# Patient Record
Sex: FEMALE | Race: WHITE | Employment: OTHER | URBAN - METROPOLITAN AREA
[De-identification: names, ages, dates, MRNs, and addresses within clinical notes are randomized per-mention and may not be internally consistent; named-entity substitution may affect disease eponyms.]

---

## 2018-08-24 ENCOUNTER — HOSPITAL ENCOUNTER (INPATIENT)
Age: 83
LOS: 4 days | Discharge: HOME HEALTH | DRG: 193 | End: 2018-08-28
Attending: EMERGENCY MEDICINE | Admitting: INTERNAL MEDICINE
Payer: MEDICARE

## 2018-08-24 ENCOUNTER — APPOINTMENT (OUTPATIENT)
Dept: GENERAL RADIOLOGY | Age: 83
DRG: 193 | End: 2018-08-24
Payer: MEDICARE

## 2018-08-24 DIAGNOSIS — D72.825 BANDEMIA: ICD-10-CM

## 2018-08-24 DIAGNOSIS — J96.11 CHRONIC RESPIRATORY FAILURE WITH HYPOXIA (HCC): ICD-10-CM

## 2018-08-24 DIAGNOSIS — R65.10 SIRS (SYSTEMIC INFLAMMATORY RESPONSE SYNDROME) (HCC): ICD-10-CM

## 2018-08-24 DIAGNOSIS — J96.20 ACUTE ON CHRONIC RESPIRATORY FAILURE, UNSPECIFIED WHETHER WITH HYPOXIA OR HYPERCAPNIA (HCC): Primary | ICD-10-CM

## 2018-08-24 DIAGNOSIS — J18.9 PNEUMONIA DUE TO ORGANISM: ICD-10-CM

## 2018-08-24 PROBLEM — J96.21 ACUTE ON CHRONIC RESPIRATORY FAILURE WITH HYPOXIA (HCC): Status: ACTIVE | Noted: 2018-08-24

## 2018-08-24 LAB
A/G RATIO: 0.8 (ref 1.1–2.2)
ALBUMIN SERPL-MCNC: 3.5 G/DL (ref 3.4–5)
ALP BLD-CCNC: 83 U/L (ref 40–129)
ALT SERPL-CCNC: 14 U/L (ref 10–40)
ANION GAP SERPL CALCULATED.3IONS-SCNC: 15 MMOL/L (ref 3–16)
AST SERPL-CCNC: 25 U/L (ref 15–37)
BANDED NEUTROPHILS RELATIVE PERCENT: 29 % (ref 0–7)
BASE EXCESS ARTERIAL: 0.5 MMOL/L (ref -3–3)
BASOPHILS ABSOLUTE: 0 K/UL (ref 0–0.2)
BASOPHILS RELATIVE PERCENT: 0 %
BILIRUB SERPL-MCNC: 0.8 MG/DL (ref 0–1)
BILIRUBIN URINE: NEGATIVE
BLOOD, URINE: ABNORMAL
BUN BLDV-MCNC: 18 MG/DL (ref 7–20)
CALCIUM SERPL-MCNC: 9.3 MG/DL (ref 8.3–10.6)
CARBOXYHEMOGLOBIN ARTERIAL: 1.3 % (ref 0–1.5)
CHLORIDE BLD-SCNC: 91 MMOL/L (ref 99–110)
CLARITY: CLEAR
CO2: 25 MMOL/L (ref 21–32)
COLOR: YELLOW
CREAT SERPL-MCNC: 0.7 MG/DL (ref 0.6–1.2)
EOSINOPHILS ABSOLUTE: 0 K/UL (ref 0–0.6)
EOSINOPHILS RELATIVE PERCENT: 0 %
EPITHELIAL CELLS, UA: ABNORMAL /HPF
GFR AFRICAN AMERICAN: >60
GFR NON-AFRICAN AMERICAN: >60
GLOBULIN: 4.4 G/DL
GLUCOSE BLD-MCNC: 155 MG/DL (ref 70–99)
GLUCOSE URINE: 100 MG/DL
HCO3 ARTERIAL: 23.7 MMOL/L (ref 21–29)
HCT VFR BLD CALC: 44.1 % (ref 36–48)
HEMOGLOBIN, ART, EXTENDED: 14.2 G/DL (ref 12–16)
HEMOGLOBIN: 15.1 G/DL (ref 12–16)
KETONES, URINE: 15 MG/DL
LACTIC ACID: 1.8 MMOL/L (ref 0.4–2)
LEUKOCYTE ESTERASE, URINE: NEGATIVE
LYMPHOCYTES ABSOLUTE: 0.9 K/UL (ref 1–5.1)
LYMPHOCYTES RELATIVE PERCENT: 12 %
MCH RBC QN AUTO: 30.3 PG (ref 26–34)
MCHC RBC AUTO-ENTMCNC: 34.2 G/DL (ref 31–36)
MCV RBC AUTO: 88.6 FL (ref 80–100)
METHEMOGLOBIN ARTERIAL: 0.2 %
MICROSCOPIC EXAMINATION: YES
MONOCYTES ABSOLUTE: 1.4 K/UL (ref 0–1.3)
MONOCYTES RELATIVE PERCENT: 19 %
MYELOCYTE PERCENT: 1 %
NEUTROPHILS ABSOLUTE: 5.2 K/UL (ref 1.7–7.7)
NEUTROPHILS RELATIVE PERCENT: 39 %
NITRITE, URINE: NEGATIVE
O2 CONTENT ARTERIAL: 19 ML/DL
O2 SAT, ARTERIAL: 93.1 %
O2 THERAPY: ABNORMAL
PCO2 ARTERIAL: 34 MMHG (ref 35–45)
PDW BLD-RTO: 13.1 % (ref 12.4–15.4)
PH ARTERIAL: 7.46 (ref 7.35–7.45)
PH UA: 6
PLATELET # BLD: 300 K/UL (ref 135–450)
PLATELET SLIDE REVIEW: ADEQUATE
PMV BLD AUTO: 7.9 FL (ref 5–10.5)
PO2 ARTERIAL: 61.7 MMHG (ref 75–108)
POTASSIUM SERPL-SCNC: 4.6 MMOL/L (ref 3.5–5.1)
PRO-BNP: 308 PG/ML (ref 0–449)
PROTEIN UA: 30 MG/DL
RBC # BLD: 4.97 M/UL (ref 4–5.2)
RBC UA: ABNORMAL /HPF (ref 0–2)
SLIDE REVIEW: ABNORMAL
SODIUM BLD-SCNC: 131 MMOL/L (ref 136–145)
SPECIFIC GRAVITY UA: 1.01
TCO2 ARTERIAL: 24.7 MMOL/L
TOTAL PROTEIN: 7.9 G/DL (ref 6.4–8.2)
TROPONIN: <0.01 NG/ML
URINE REFLEX TO CULTURE: ABNORMAL
URINE TYPE: ABNORMAL
UROBILINOGEN, URINE: 4 E.U./DL
WBC # BLD: 7.5 K/UL (ref 4–11)
WBC UA: ABNORMAL /HPF (ref 0–5)

## 2018-08-24 PROCEDURE — 94640 AIRWAY INHALATION TREATMENT: CPT

## 2018-08-24 PROCEDURE — 6370000000 HC RX 637 (ALT 250 FOR IP): Performed by: PHYSICIAN ASSISTANT

## 2018-08-24 PROCEDURE — 93005 ELECTROCARDIOGRAM TRACING: CPT | Performed by: PHYSICIAN ASSISTANT

## 2018-08-24 PROCEDURE — 2700000000 HC OXYGEN THERAPY PER DAY

## 2018-08-24 PROCEDURE — 2060000000 HC ICU INTERMEDIATE R&B

## 2018-08-24 PROCEDURE — 2500000003 HC RX 250 WO HCPCS: Performed by: PHYSICIAN ASSISTANT

## 2018-08-24 PROCEDURE — 83880 ASSAY OF NATRIURETIC PEPTIDE: CPT

## 2018-08-24 PROCEDURE — 80053 COMPREHEN METABOLIC PANEL: CPT

## 2018-08-24 PROCEDURE — 96366 THER/PROPH/DIAG IV INF ADDON: CPT

## 2018-08-24 PROCEDURE — 6360000002 HC RX W HCPCS: Performed by: PHYSICIAN ASSISTANT

## 2018-08-24 PROCEDURE — 96375 TX/PRO/DX INJ NEW DRUG ADDON: CPT

## 2018-08-24 PROCEDURE — 82803 BLOOD GASES ANY COMBINATION: CPT

## 2018-08-24 PROCEDURE — 81001 URINALYSIS AUTO W/SCOPE: CPT

## 2018-08-24 PROCEDURE — 6360000002 HC RX W HCPCS: Performed by: EMERGENCY MEDICINE

## 2018-08-24 PROCEDURE — 87040 BLOOD CULTURE FOR BACTERIA: CPT

## 2018-08-24 PROCEDURE — 83605 ASSAY OF LACTIC ACID: CPT

## 2018-08-24 PROCEDURE — 2580000003 HC RX 258: Performed by: PHYSICIAN ASSISTANT

## 2018-08-24 PROCEDURE — 96365 THER/PROPH/DIAG IV INF INIT: CPT

## 2018-08-24 PROCEDURE — 85025 COMPLETE CBC W/AUTO DIFF WBC: CPT

## 2018-08-24 PROCEDURE — 99285 EMERGENCY DEPT VISIT HI MDM: CPT

## 2018-08-24 PROCEDURE — 84484 ASSAY OF TROPONIN QUANT: CPT

## 2018-08-24 PROCEDURE — S0028 INJECTION, FAMOTIDINE, 20 MG: HCPCS | Performed by: PHYSICIAN ASSISTANT

## 2018-08-24 PROCEDURE — 94664 DEMO&/EVAL PT USE INHALER: CPT

## 2018-08-24 RX ORDER — SODIUM CHLORIDE 0.9 % (FLUSH) 0.9 %
10 SYRINGE (ML) INJECTION PRN
Status: DISCONTINUED | OUTPATIENT
Start: 2018-08-24 | End: 2018-08-28 | Stop reason: HOSPADM

## 2018-08-24 RX ORDER — 0.9 % SODIUM CHLORIDE 0.9 %
30 INTRAVENOUS SOLUTION INTRAVENOUS ONCE
Status: COMPLETED | OUTPATIENT
Start: 2018-08-24 | End: 2018-08-24

## 2018-08-24 RX ORDER — IPRATROPIUM BROMIDE AND ALBUTEROL SULFATE 2.5; .5 MG/3ML; MG/3ML
1 SOLUTION RESPIRATORY (INHALATION) ONCE
Status: COMPLETED | OUTPATIENT
Start: 2018-08-24 | End: 2018-08-24

## 2018-08-24 RX ORDER — LEVOFLOXACIN 5 MG/ML
500 INJECTION, SOLUTION INTRAVENOUS ONCE
Status: COMPLETED | OUTPATIENT
Start: 2018-08-25 | End: 2018-08-25

## 2018-08-24 RX ORDER — SODIUM CHLORIDE 0.9 % (FLUSH) 0.9 %
10 SYRINGE (ML) INJECTION EVERY 12 HOURS SCHEDULED
Status: DISCONTINUED | OUTPATIENT
Start: 2018-08-24 | End: 2018-08-28 | Stop reason: HOSPADM

## 2018-08-24 RX ORDER — ONDANSETRON 2 MG/ML
4 INJECTION INTRAMUSCULAR; INTRAVENOUS EVERY 6 HOURS PRN
Status: DISCONTINUED | OUTPATIENT
Start: 2018-08-24 | End: 2018-08-28 | Stop reason: HOSPADM

## 2018-08-24 RX ORDER — ACETAMINOPHEN 500 MG
500 TABLET ORAL EVERY 6 HOURS PRN
COMMUNITY

## 2018-08-24 RX ORDER — LEVOFLOXACIN 5 MG/ML
250 INJECTION, SOLUTION INTRAVENOUS EVERY 24 HOURS
Status: DISCONTINUED | OUTPATIENT
Start: 2018-08-26 | End: 2018-08-28

## 2018-08-24 RX ORDER — ONDANSETRON 2 MG/ML
4 INJECTION INTRAMUSCULAR; INTRAVENOUS EVERY 30 MIN PRN
Status: DISCONTINUED | OUTPATIENT
Start: 2018-08-24 | End: 2018-08-24

## 2018-08-24 RX ORDER — ALBUTEROL SULFATE 2.5 MG/3ML
2.5 SOLUTION RESPIRATORY (INHALATION)
Status: DISCONTINUED | OUTPATIENT
Start: 2018-08-24 | End: 2018-08-24

## 2018-08-24 RX ORDER — ACETAMINOPHEN 160 MG
TABLET,DISINTEGRATING ORAL
COMMUNITY

## 2018-08-24 RX ORDER — ALBUTEROL SULFATE 2.5 MG/3ML
2.5 SOLUTION RESPIRATORY (INHALATION)
Status: DISCONTINUED | OUTPATIENT
Start: 2018-08-25 | End: 2018-08-26

## 2018-08-24 RX ORDER — METHYLPREDNISOLONE SODIUM SUCCINATE 125 MG/2ML
125 INJECTION, POWDER, LYOPHILIZED, FOR SOLUTION INTRAMUSCULAR; INTRAVENOUS ONCE
Status: COMPLETED | OUTPATIENT
Start: 2018-08-24 | End: 2018-08-24

## 2018-08-24 RX ORDER — LEVOFLOXACIN 5 MG/ML
500 INJECTION, SOLUTION INTRAVENOUS EVERY 24 HOURS
Status: DISCONTINUED | OUTPATIENT
Start: 2018-08-25 | End: 2018-08-24 | Stop reason: DRUGHIGH

## 2018-08-24 RX ADMIN — IPRATROPIUM BROMIDE AND ALBUTEROL SULFATE 1 AMPULE: .5; 3 SOLUTION RESPIRATORY (INHALATION) at 18:33

## 2018-08-24 RX ADMIN — SODIUM CHLORIDE 1000 ML: 9 INJECTION, SOLUTION INTRAVENOUS at 18:01

## 2018-08-24 RX ADMIN — METHYLPREDNISOLONE SODIUM SUCCINATE 125 MG: 125 INJECTION, POWDER, FOR SOLUTION INTRAMUSCULAR; INTRAVENOUS at 18:00

## 2018-08-24 RX ADMIN — IPRATROPIUM BROMIDE AND ALBUTEROL SULFATE 1 AMPULE: .5; 3 SOLUTION RESPIRATORY (INHALATION) at 18:34

## 2018-08-24 RX ADMIN — CEFTRIAXONE SODIUM 1 G: 1 INJECTION, POWDER, FOR SOLUTION INTRAMUSCULAR; INTRAVENOUS at 18:01

## 2018-08-24 RX ADMIN — ONDANSETRON HYDROCHLORIDE 4 MG: 2 INJECTION, SOLUTION INTRAMUSCULAR; INTRAVENOUS at 18:01

## 2018-08-24 RX ADMIN — FAMOTIDINE 20 MG: 10 INJECTION, SOLUTION INTRAVENOUS at 18:00

## 2018-08-24 RX ADMIN — AZITHROMYCIN MONOHYDRATE 500 MG: 500 INJECTION, POWDER, LYOPHILIZED, FOR SOLUTION INTRAVENOUS at 19:36

## 2018-08-24 NOTE — ED PROVIDER NOTES
CHIEF COMPLAINT   Shortness of Breath (increased shortness of breath for 2 weeks Patient sent from PCP for pneumonia)      PATIENT INFORMATION  Nancy Grady is a 80 y.o. female who presents to the ED for evaluation of 2 weeks of increasing SOB, fevers and chills, Gradual onset, gradually worsening. +productive cough, symptoms worse with exertion. Pt has home NCO2, 2L for home PRN use, she states she just uses at night time, she has been needing it all the time, she has been vomiting. Unable to tolerate PO intake over the past 2 days. Saw PCP earlier today who did xray, advised double pneumonia, she needed to come to the ED for admission. I have reviewed the following from the nursing documentation, and I have confirmed the past medical history, medications, allergies, social history and family history with the patient. Past Medical History:   Diagnosis Date    Cancer [facility])     skin cancer     Chronic respiratory failure (HCC)     Emphysema     Left THR 2/21/2012    Osteoporosis     Pneumonia      Past Surgical History:   Procedure Laterality Date    ECTOPIC PREGNANCY SURGERY      EYE SURGERY Left 6/26/14    Left Eye Catararact    JOINT REPLACEMENT  2/20/12    BILATERAL HIP REPLACEMENT    JOINT REPLACEMENT      bilaterally    OTHER SURGICAL HISTORY  5-    right total hip replacement    TUBAL LIGATION       Family History   Problem Relation Age of Onset    Heart Disease Mother     Heart Disease Father      Social History     Social History    Marital status: Single     Spouse name: N/A    Number of children: N/A    Years of education: N/A     Occupational History    Not on file.      Social History Main Topics    Smoking status: Former Smoker     Years: 30.00     Quit date: 1/1/2012    Smokeless tobacco: Never Used    Alcohol use No    Drug use: No    Sexual activity: Not on file     Other Topics Concern    Not on file     Social History Narrative    No narrative on file     Current Facility-Administered Medications   Medication Dose Route Frequency Provider Last Rate Last Dose    sodium chloride flush 0.9 % injection 10 mL  10 mL Intravenous 2 times per day Kaitlynn Gongora MD        sodium chloride flush 0.9 % injection 10 mL  10 mL Intravenous PRN Kaitlynn Gongora MD        magnesium hydroxide (MILK OF MAGNESIA) 400 MG/5ML suspension 30 mL  30 mL Oral Daily PRN Kaitlynn Gongora MD        ondansetron [facility]) injection 4 mg  4 mg Intravenous Q6H PRN Kaitlynn Gongora MD        [START ON 8/25/2018] enoxaparin (LOVENOX) injection 40 mg  40 mg Subcutaneous Daily Kaitlynn Gongora MD        albuterol (PROVENTIL) nebulizer solution 2.5 mg  2.5 mg Nebulization Q2H PRN MD Jessica Conleypanfilo Topete Billey Blind ON 8/25/2018] levofloxacin (LEVAQUIN) 500 MG/100ML infusion 500 mg  500 mg Intravenous Once Kaitlynn Gongora MD        Followed by   Bird Aguilar ON 8/26/2018] levofloxacin (LEVAQUIN) 250 MG/50ML infusion 250 mg  250 mg Intravenous Q24H Kaitlynn Gongora MD         No Known Allergies    REVIEW OF SYSTEMS  10 systems reviewed, pertinent positives per HPI otherwise noted to be negative. PHYSICAL EXAM  BP (!) 141/74   Pulse 105   Temp 99.4 °F (37.4 °C) (Oral)   Resp 24   Ht 5' 2\" (1.575 m)   Wt 130 lb (59 kg)   SpO2 93%   Breastfeeding? No   BMI 23.78 kg/m²  Abnormal, 90% on 6LNC    GENERAL APPEARANCE: Awake and alert. Cooperative. Mild distress. Diaphoresis  HEAD: Normocephalic. Atraumatic. EYES: PERRL. EOM's grossly intact. No scleral injection or icterus. ENT: Mucous membranes are moist.   NECK: Supple. No tracheal deviation. HEART: Tachycardia  LUNGS: Respirations labored. +increased work of breathing, accessory muscle use.  +crackles to bases, worse on right. ABDOMEN: Soft. Non-distended. Non-tender. No guarding or rebound. Normal bowel sounds. EXTREMITIES: No peripheral edema. Moves all extremities equally. All extremities neurovascularly intact. SKIN: Warm and dry. pO2, Arterial 61.7 (L) 75.0 - 108.0 mmHg    HCO3, Arterial 23.7 21.0 - 29.0 mmol/L    Base Excess, Arterial 0.5 -3.0 - 3.0 mmol/L    Hemoglobin, Art, Extended 14.2 12.0 - 16.0 g/dL    O2 Sat, Arterial 93.1 >92 %    Carboxyhgb, Arterial 1.3 0.0 - 1.5 %    Methemoglobin, Arterial 0.2 <1.5 %    TCO2, Arterial 24.7 Not Established mmol/L    O2 Content, Arterial 19 Not Established mL/dL    O2 Therapy Unknown    Urinalysis Reflex to Culture   Result Value Ref Range    Color, UA Yellow Straw/Yellow    Clarity, UA Clear Clear    Glucose, Ur 100 (A) Negative mg/dL    Bilirubin Urine Negative Negative    Ketones, Urine 15 (A) Negative mg/dL    Specific Gravity, UA 1.010 1.005 - 1.030    Blood, Urine TRACE-INTACT (A) Negative    pH, UA 6.0 5.0 - 8.0    Protein, UA 30 (A) Negative mg/dL    Urobilinogen, Urine 4.0 (A) <2.0 E.U./dL    Nitrite, Urine Negative Negative    Leukocyte Esterase, Urine Negative Negative    Microscopic Examination YES     Urine Reflex to Culture Not Indicated     Urine Type Not Specified    Brain Natriuretic Peptide   Result Value Ref Range    Pro- 0 - 449 pg/mL   Troponin   Result Value Ref Range    Troponin <0.01 <0.01 ng/mL   Microscopic Urinalysis   Result Value Ref Range    WBC, UA 0-2 0 - 5 /HPF    RBC, UA 3-5 (A) 0 - 2 /HPF    Epi Cells 0-2 /HPF   EKG 12 lead   Result Value Ref Range    Ventricular Rate 97 BPM    Atrial Rate 97 BPM    P-R Interval 166 ms    QRS Duration 90 ms    Q-T Interval 342 ms    QTc Calculation (Bazett) 434 ms    P Axis 65 degrees    R Axis 70 degrees    T Axis 74 degrees    Diagnosis       Normal sinus rhythmPossible Left atrial enlargementRSR' or QR pattern in V1 suggests right ventricular conduction delayBorderline ECGWhen compared with ECG of 29-MAY-2014 09:37,Vent. rate has increased BY  37 BPM           RADIOLOGY  (From paper work brought in with patient.)     Ave At [address] [place] [address]  08/24/2018:    IMPRESSION: Development of patchy bibasilar infiltrates suggesting pneumonia      CONSULTATIONS: Hospitalist, Padmaja Hood, agrees to admit in stable condition. ED COURSE/MDM  Patient seen and evaluated. This patient was seen with Dr. Jose Alejandro Godinez. Old records reviewed. Labs and imaging reviewed and results discussed. Patient is afebrile, indicated she had been taking Motrin at home every 6 hours to help prevent fevers, tachycardic on initial presentation. Tachypneic and hypoxic. I ordered patient IV fluids Rocephin and azithromycin Solu-Medrol and duo nebs and Pepcid and zofran as patient had been vomiting. ABG is ordered as I consider the patient may need BiPAP. ABG shows a pH of 7.46 PCO2 of 34 PO2 61.7. Please order for BiPAP as I feel patient is compensating and is going to wear out. Patient's urinalysis shows ketonuria proteinuria hematuria glucose urea. Patient had elevation in her band at 29. Hyponatremia sodium of 131 chloride of 91 glucose of 155. Lactic acid returns at 1.8. Imaging of chest is not repeated as patient brought in results which were from x-ray which was performed this morning. Consult the hospitalist for admission agrees to admit the patient in stable condition. I reevaluated the patient and discussed admission and she is agreeable this time.     Patient was given the following medications in the ED:  Medications   sodium chloride flush 0.9 % injection 10 mL (not administered)   sodium chloride flush 0.9 % injection 10 mL (not administered)   magnesium hydroxide (MILK OF MAGNESIA) 400 MG/5ML suspension 30 mL (not administered)   ondansetron (ZOFRAN) injection 4 mg (not administered)   enoxaparin (LOVENOX) injection 40 mg (not administered)   albuterol (PROVENTIL) nebulizer solution 2.5 mg (not administered)   levofloxacin (LEVAQUIN) 500 MG/100ML infusion 500 mg (not administered)     Followed by   levofloxacin (LEVAQUIN) 250 MG/50ML infusion 250 mg (not administered)   0.9 % sodium chloride IV bolus 1,770 mL (1,000 mLs Intravenous New Bag 8/24/18 1801)   cefTRIAXone (ROCEPHIN) 1 g IVPB in 50 mL D5W minibag (0 g Intravenous Stopped 8/24/18 1943)   azithromycin (ZITHROMAX) 500 mg in D5W 250ml addavial (0 mg Intravenous Stopped 8/24/18 2042)   ipratropium-albuterol (DUONEB) nebulizer solution 1 ampule (1 ampule Inhalation Given 8/24/18 1834)   ipratropium-albuterol (DUONEB) nebulizer solution 1 ampule (1 ampule Inhalation Given 8/24/18 1833)   methylPREDNISolone sodium (SOLU-MEDROL) injection 125 mg (125 mg Intravenous Given 8/24/18 1800)   famotidine (PEPCID) injection 20 mg (20 mg Intravenous Given 8/24/18 1800)     At this time, patient is ready for admission. Patient was given scripts for the following medications. I counseled patient how to take these medications. Current Discharge Medication List          CLINICAL IMPRESSION  1. Acute on chronic respiratory failure, unspecified whether with hypoxia or hypercapnia ([facility]    2. SIRS (systemic inflammatory response syndrome) (HCC)    3. Bandemia    4. Pneumonia due to organism        Blood pressure (!) 141/74, pulse 105, temperature 99.4 °F (37.4 °C), temperature source Oral, resp. rate 24, height 5' 2\" (1.575 m), weight 130 lb (59 kg), SpO2 93 %, not currently breastfeeding. [address] is in stable condition upon Admit to telemetry.         Migdalia Gaitan PA-C  08/24/18 [id number]

## 2018-08-24 NOTE — ED NOTES
Bed: 08  Expected date:   Expected time:   Means of arrival:   Comments:  Darrian Antony  08/24/18 [id number]

## 2018-08-25 PROCEDURE — 94640 AIRWAY INHALATION TREATMENT: CPT

## 2018-08-25 PROCEDURE — 2580000003 HC RX 258: Performed by: INTERNAL MEDICINE

## 2018-08-25 PROCEDURE — 94761 N-INVAS EAR/PLS OXIMETRY MLT: CPT

## 2018-08-25 PROCEDURE — 2700000000 HC OXYGEN THERAPY PER DAY

## 2018-08-25 PROCEDURE — 92526 ORAL FUNCTION THERAPY: CPT

## 2018-08-25 PROCEDURE — 87205 SMEAR GRAM STAIN: CPT

## 2018-08-25 PROCEDURE — G8997 SWALLOW GOAL STATUS: HCPCS

## 2018-08-25 PROCEDURE — 92610 EVALUATE SWALLOWING FUNCTION: CPT

## 2018-08-25 PROCEDURE — G8996 SWALLOW CURRENT STATUS: HCPCS

## 2018-08-25 PROCEDURE — 87070 CULTURE OTHR SPECIMN AEROBIC: CPT

## 2018-08-25 PROCEDURE — 6360000002 HC RX W HCPCS: Performed by: INTERNAL MEDICINE

## 2018-08-25 PROCEDURE — 2060000000 HC ICU INTERMEDIATE R&B

## 2018-08-25 PROCEDURE — 94664 DEMO&/EVAL PT USE INHALER: CPT

## 2018-08-25 PROCEDURE — 94667 MNPJ CHEST WALL 1ST: CPT

## 2018-08-25 PROCEDURE — 93010 ELECTROCARDIOGRAM REPORT: CPT | Performed by: INTERNAL MEDICINE

## 2018-08-25 PROCEDURE — 94668 MNPJ CHEST WALL SBSQ: CPT

## 2018-08-25 RX ADMIN — ALBUTEROL SULFATE 2.5 MG: 2.5 SOLUTION RESPIRATORY (INHALATION) at 06:49

## 2018-08-25 RX ADMIN — ALBUTEROL SULFATE 2.5 MG: 2.5 SOLUTION RESPIRATORY (INHALATION) at 11:02

## 2018-08-25 RX ADMIN — Medication 10 ML: at 21:25

## 2018-08-25 RX ADMIN — ENOXAPARIN SODIUM 40 MG: 40 INJECTION SUBCUTANEOUS at 09:22

## 2018-08-25 RX ADMIN — ALBUTEROL SULFATE 2.5 MG: 2.5 SOLUTION RESPIRATORY (INHALATION) at 14:48

## 2018-08-25 RX ADMIN — LEVOFLOXACIN 500 MG: 5 INJECTION, SOLUTION INTRAVENOUS at 09:23

## 2018-08-25 RX ADMIN — Medication 10 ML: at 09:22

## 2018-08-25 RX ADMIN — ALBUTEROL SULFATE 2.5 MG: 2.5 SOLUTION RESPIRATORY (INHALATION) at 19:03

## 2018-08-25 RX ADMIN — ALBUTEROL SULFATE 2.5 MG: 2.5 SOLUTION RESPIRATORY (INHALATION) at 02:47

## 2018-08-25 NOTE — PROGRESS NOTES
Speech Language Pathology  Facility/Department: [facility] PCU TELEMETRY   BEDSIDE SWALLOW EVALUATION    NAME: Nancy Grady  : 1934  MRN: [id number]    ADMISSION DATE: 2018  ADMITTING DIAGNOSIS: has Pulmonary emphysema ([address] Osteoporosis; Status post THR (total hip replacement); Chronic respiratory failure ([address] Right THR; and Acute on chronic respiratory failure with hypoxia ([address] on her problem list.  ONSET DATE:  18    Recent Chest Xray/CT of Chest: None available in chart    Date of Eval: 2018  Evaluating Therapist: Catarina Carnes    Current Diet level:  Current Diet : Regular  Current Liquid Diet : Full      Primary Complaint  Patient Complaint: Patient has no complaints about her swallow. Pain:  Pain Assessment  Patient Currently in Pain: Denies    Reason for Referral  Nancy Grady was referred for a bedside swallow evaluation to assess the efficiency of her swallow function, identify signs and symptoms of aspiration and make recommendations regarding safe dietary consistencies, effective compensatory strategies, and safe eating environment. Impression  Dysphagia Diagnosis: Mild pharyngeal stage dysphagia  Dysphagia Outcome Severity Scale: Level 6: Within functional limits/Modified independence     Patient seen up in bed, alert and agreeable to assessment. RN ok'd SLP entry and assessment. Patient denies difficulty swallowing, but supports feeling like foods \"occasionally\" go down the wrong pipe. Patient does not feel like any one particular food item causes difficulty. Patient observed with thin liquids via cup (hot cup of coffee) with delayed swallow onset, audible swallow, suspected decreased laryngeal elevation, and change in O2 sats from 95% to 94%. Patient's O2 remained at 94% through trials of puree, pudding, and pudding with softened valentin cracker with occasional up-ticks to 95%.  During these PO trials patient demonstrated mildly delayed swallow onset, suspected decreased Long-term Goals: 1 week  Goal 1: Patient will tolerate least restrictive diet as demonstrated by no overt s/s of asp/pen on 100% of observed swallows. General  Chart Reviewed: Yes  Subjective  Subjective: Patient seen up in bed, alert and agreeable to assessment. RN ok'd SLP entry and assessment. Behavior/Cognition: Alert; Cooperative  Respiratory Status: O2 via nasual cannula  O2 Device: Nasal cannula  Communication Observation: Functional  Follows Directions: Complex  Dentition: Dentures top;Dentures bottom  Patient Positioning: Upright in bed  Baseline Vocal Quality: Normal  Volitional Cough: Strong;Congested; Wet  Volitional Swallow: Delayed  Prior Dysphagia History: Pt has no prior dysphagia hx per chart review. Patient supports feeling like foods \"occasionally\" go down the wrong pipe, but does not feel like any one particular thing causes difficulty. Consistencies Administered: Pudding - teaspoon           Vision/Hearing  Vision  Vision: Within Functional Limits  Hearing  Hearing: Within functional limits    Oral Motor Deficits  Oral/Motor  Oral Motor: Within functional limits    Oral Phase Dysfunction  Oral Phase  Oral Phase: WFL     Indicators of Pharyngeal Phase Dysfunction   Pharyngeal Phase  Pharyngeal Phase: Exceptions  Indicators of Pharyngeal Phase Dysfunction  Delayed Swallow: Thin - cup; Thin - straw;Puree  Decreased Laryngeal Elevation: Puree; Soft Solid; Reg Solid; Thin - cup; Thin - straw;Pudding - teaspoon  Change in Vital Signs: Thin - cup    Prognosis  Prognosis  Prognosis for safe diet advancement: good  Barriers to reach goals: age  Individuals consulted  Consulted and agree with results and recommendations: Patient;RN    Education  Patient Education: Patient educated on role of SLP, reason for assessment, assessment results and recommendations.    Patient Education Response: Verbalizes understanding      G-Code  SLP G-Codes  Functional Limitations: Swallowing  Swallow Current Status ():

## 2018-08-25 NOTE — H&P
plan:    ASSESSMENT/PLAN:  Active Hospital Problems    Diagnosis Date Noted    Acute on chronic respiratory failure with hypoxia (HCC) [J96.21] 08/24/2018     abx  Oxygen  Nebs   May need bipap    DVT Prophylaxis: lovenox   Diet:    Code Status: Prior      Dispo - home        Jose Juan Jesus MD  The note was completed using EMR. Every effort was made to ensure accuracy; however, inadvertent computerized transcription errors may be present. Thank you Genaro Lou MD for the opportunity to be involved in this patient's care. If you have any questions or concerns please feel free to contact me at [phone].

## 2018-08-25 NOTE — PROGRESS NOTES
RESPIRATORY THERAPY ASSESSMENT    Name:  Ignacia Calderón Record Number:  [id number]  Age: 80 y.o. Gender: female  : 1934  Today's Date:  2018  Room:  /0307-01    Assessment     Is the patient being admitted for a COPD or Asthma exacerbation? No   (If yes the patient will be seen every 4 hours for the first 24 hours and then reassessed)    Patient Admission Diagnosis      Allergies  No Known Allergies    Minimum Predicted Vital Capacity:     748          Actual Vital Capacity:      540          Pulmonary History:emphysema  Home Oxygen Therapy:  2L at hs   Home Respiratory Therapy:anoro ellipta daily   Current Respiratory Therapy:  Albuterol q2 prn  Treatment Type: HHN  Medications: Albuterol/Ipratropium    Respiratory Severity Index(RSI)   Patients with orders for inhalation medications, oxygen, or any therapeutic treatment modality will be placed on Respiratory Protocol. They will be assessed with the first treatment and at least every 72 hours thereafter. The following severity scale will be used to determine frequency of treatment intervention.     Smoking History: Pulmonary Disease or Smoking History, Greater than 15 pack year = 2    Social History  Social History   Substance Use Topics    Smoking status: Former Smoker     Years: 30.00     Quit date: 2012    Smokeless tobacco: Never Used    Alcohol use No       Recent Surgical History: None = 0  Past Surgical History  Past Surgical History:   Procedure Laterality Date    ECTOPIC PREGNANCY SURGERY      EYE SURGERY Left 14    Left Eye Catararact    JOINT REPLACEMENT  12    BILATERAL HIP REPLACEMENT    JOINT REPLACEMENT      bilaterally    OTHER SURGICAL HISTORY  2012    right total hip replacement    TUBAL LIGATION         Level of Consciousness: Alert, Oriented, and Cooperative = 0    Level of Activity: Walking unassisted = 0    Respiratory Pattern: Use of accessory muscles;prolonged expiration; or RR greater than 30 = 3    Breath Sounds: Absent bilaterally and/or with wheezes = 3    Sputum   ,  ,    Cough: Strong, spontaneous, non-productive = 0    Vital Signs   BP (!) 110/56   Pulse 103   Temp 98.7 °F (37.1 °C) (Oral)   Resp 20   Ht 5' 2\" (1.575 m)   Wt 135 lb 11.2 oz (61.6 kg)   SpO2 92%   Breastfeeding? No   BMI 24.82 kg/m²   SPO2 (COPD values may differ): 86-87% on room air or greater than 92% on FiO2 35- 50% = 3    Peak Flow (asthma only): not applicable = 0    RSI: 09-00 = Q6H or QID and Q4HPRN for dyspnea        Plan       Goals: medication delivery and improve oxygenation    Patient/caregiver was educated on the proper method of use for Respiratory Care Devices:  Yes      Level of patient/caregiver understanding able to:   [] Verbalize understanding   [] Demonstrate understanding       [] Teach back        [] Needs reinforcement       []  No available caregiver               []  Other:     Response to education:  Good     Is patient being placed on Home Treatment Regimen? No     Does the patient have everything they need prior to discharge? NA     Comments: chart reviewed and patient assessed    Plan of Care: change albuterol prn to albuterol q4wa for wheezes    Electronically signed by Slava Jones RCP on 8/24/2018 at 11:43 PM    Respiratory Protocol Guidelines     1. Assessment and treatment by Respiratory Therapy will be initiated for medication and therapeutic interventions upon initiation of aerosolized medication. 2. Physician will be contacted for respiratory rate (RR) greater than 35 breaths per minute. Therapy will be held for heart rate (HR) greater than 140 beats per minute, pending direction from physician. 3. Bronchodilators will be administered via Metered Dose Inhaler (MDI) with spacer when the following criteria are met:  a. Alert and cooperative     b. HR < 140 bpm  c. RR < 30 bpm                d. Can demonstrate a 23 second inspiratory hold  4.  Bronchodilators will be administered via Hand Held Nebulizer FELIPE [facility]) to patients when ANY of the following criteria are met  a. Incognizant or uncooperative          b. Patients treated with HHN at Home        c. Unable to demonstrate proper use of MDI with spacer     d. RR > 30 bpm   5. Bronchodilators will be delivered via Metered Dose Inhaler (MDI), HHN, Aerogen to intubated patients on mechanical ventilation. 6. Inhalation medication orders will be delivered and/or substituted as outlined below. Aerosolized Medications Ordering and Administration Guidelines:    1. All Medications will be ordered by a physician, and their frequency and/or modality will be adjusted as defined by the patients Respiratory Severity Index (RSI) score. 2. If the patient does not have documented COPD, consider discontinuing anticholinergics when RSI is less than 9.  3. If the bronchospasm worsens (increased RSI), then the bronchodilator frequency can be increased to a maximum of every 4 hours. If greater than every 4 hours is required, the physician will be contacted. 4. If the bronchospasm improves, the frequency of the bronchodilator can be decreased, based on the patient's RSI, but not less than home treatment regimen frequency. 5. Bronchodilator(s) will be discontinued if patient has a RSI less than 9 and has received no scheduled or as needed treatment for 72  Hrs. Patients Ordered on a Mucolytic Agent:    1. Must always be administered with a bronchodilator. 2. Discontinue if patient experiences worsened bronchospasm, or secretions have lessened to the point that the patient is able to clear them with a cough. Anti-inflammatory and Combination Medications:    1. If the patient lacks prior history of lung disease, is not using inhaled anti-inflammatory medication at home, and lacks wheezing by examination or by history for at least 24 hours, contact physician for possible discontinuation.

## 2018-08-25 NOTE — PROGRESS NOTES
Admission assessment completed. See flow sheet. Respiration labored especially with movement. Pt on 6L high flow O2, denies shortness of breath. Pt has very congested cough. Lungs sound diminished with crackles and rhonchi on bilateral sides. Pt last BM 8/24. Pt reports generalized weakness and was educated on the use of call button for help going to restroom. Bed alarm on. Pt alert and oriented. Side rails up x 2. IVF infusing without complications. Pt denies any needs at this time. Call light within reach. Will continue to monitor.

## 2018-08-25 NOTE — PROGRESS NOTES
4 Eyes Skin Assessment     The patient is being assess for   Admission    I agree that 2 RN's have performed a thorough Head to Toe Skin Assessment on the patient. ALL assessment sites listed below have been assessed. Areas assessed by both nurses: Darcie Neighbours  [x]   Head, Face, and Ears   [x]   Shoulders, Back, and Chest, Abdomen  [x]   Arms, Elbows, and Hands   [x]   Coccyx, Sacrum, and Ischium  [x]   Legs, Feet, and Heels        Pt has scattered moles over body. No skin issues noted.     **SHARE this note so that the co-signing nurse is able to place an eSignature**      Does the Patient have Skin Breakdown?  no         Anup Prevention initiated:  No   Wound Care Orders initiated:  No      WOC nurse consulted for Pressure Injury (Stage 3,4, Unstageable, DTI, NWPT, Complex wounds)and New or Established Ostomies:  No      Primary Nurse eSignature: Electronically signed by Flaco Canas RN on 8/24/18 at 11:29 PM    Co-signer eSignature: Electronically signed by Yoan Barrientos RN on 8/25/18 at 1:19 AM

## 2018-08-26 ENCOUNTER — APPOINTMENT (OUTPATIENT)
Dept: GENERAL RADIOLOGY | Age: 83
DRG: 193 | End: 2018-08-26
Payer: MEDICARE

## 2018-08-26 PROCEDURE — 71046 X-RAY EXAM CHEST 2 VIEWS: CPT

## 2018-08-26 PROCEDURE — 6360000002 HC RX W HCPCS: Performed by: INTERNAL MEDICINE

## 2018-08-26 PROCEDURE — 94640 AIRWAY INHALATION TREATMENT: CPT

## 2018-08-26 PROCEDURE — 2060000000 HC ICU INTERMEDIATE R&B

## 2018-08-26 PROCEDURE — 94668 MNPJ CHEST WALL SBSQ: CPT

## 2018-08-26 PROCEDURE — 2700000000 HC OXYGEN THERAPY PER DAY

## 2018-08-26 PROCEDURE — 94664 DEMO&/EVAL PT USE INHALER: CPT

## 2018-08-26 PROCEDURE — 6370000000 HC RX 637 (ALT 250 FOR IP): Performed by: INTERNAL MEDICINE

## 2018-08-26 PROCEDURE — 2580000003 HC RX 258: Performed by: INTERNAL MEDICINE

## 2018-08-26 PROCEDURE — 94761 N-INVAS EAR/PLS OXIMETRY MLT: CPT

## 2018-08-26 RX ORDER — IPRATROPIUM BROMIDE AND ALBUTEROL SULFATE 2.5; .5 MG/3ML; MG/3ML
1 SOLUTION RESPIRATORY (INHALATION)
Status: DISCONTINUED | OUTPATIENT
Start: 2018-08-26 | End: 2018-08-28 | Stop reason: HOSPADM

## 2018-08-26 RX ORDER — METHYLPREDNISOLONE SODIUM SUCCINATE 40 MG/ML
40 INJECTION, POWDER, LYOPHILIZED, FOR SOLUTION INTRAMUSCULAR; INTRAVENOUS EVERY 12 HOURS
Status: DISCONTINUED | OUTPATIENT
Start: 2018-08-26 | End: 2018-08-28

## 2018-08-26 RX ADMIN — ALBUTEROL SULFATE 2.5 MG: 2.5 SOLUTION RESPIRATORY (INHALATION) at 07:32

## 2018-08-26 RX ADMIN — Medication 10 ML: at 20:11

## 2018-08-26 RX ADMIN — ENOXAPARIN SODIUM 40 MG: 40 INJECTION SUBCUTANEOUS at 08:38

## 2018-08-26 RX ADMIN — IPRATROPIUM BROMIDE AND ALBUTEROL SULFATE 1 AMPULE: .5; 3 SOLUTION RESPIRATORY (INHALATION) at 15:46

## 2018-08-26 RX ADMIN — ALBUTEROL SULFATE 2.5 MG: 2.5 SOLUTION RESPIRATORY (INHALATION) at 11:24

## 2018-08-26 RX ADMIN — Medication 10 ML: at 08:39

## 2018-08-26 RX ADMIN — IPRATROPIUM BROMIDE AND ALBUTEROL SULFATE 1 AMPULE: .5; 3 SOLUTION RESPIRATORY (INHALATION) at 18:58

## 2018-08-26 RX ADMIN — LEVOFLOXACIN 250 MG: 5 INJECTION, SOLUTION INTRAVENOUS at 08:39

## 2018-08-26 RX ADMIN — METHYLPREDNISOLONE SODIUM SUCCINATE 40 MG: 40 INJECTION, POWDER, FOR SOLUTION INTRAMUSCULAR; INTRAVENOUS at 14:15

## 2018-08-26 NOTE — PROGRESS NOTES
Pt resting in bed playing on personal tablet. Assessment complete-see flowsheet. Medications given-see MAR. Pt denies any further needs at this time. Call light and bedside table within reach. Will continue to monitor.

## 2018-08-26 NOTE — PLAN OF CARE
Problem: Gas Exchange - Impaired:  Goal: Levels of oxygenation will improve  Levels of oxygenation will improve   Outcome: Ongoing  Pt currently on 4L O2 per HFNC. Gets very out of breath with any activity. IV antibiotics administered per orders. CXR obtained. Will continue to monitor.

## 2018-08-27 PROBLEM — J18.9 PNEUMONIA DUE TO INFECTIOUS ORGANISM: Status: ACTIVE | Noted: 2018-08-27

## 2018-08-27 PROBLEM — J96.20 ACUTE ON CHRONIC RESPIRATORY FAILURE (HCC): Status: ACTIVE | Noted: 2018-08-24

## 2018-08-27 PROBLEM — J44.1 COPD EXACERBATION (HCC): Status: ACTIVE | Noted: 2018-08-27

## 2018-08-27 LAB
CULTURE, RESPIRATORY: NORMAL
EKG ATRIAL RATE: 97 BPM
EKG DIAGNOSIS: NORMAL
EKG P AXIS: 65 DEGREES
EKG P-R INTERVAL: 166 MS
EKG Q-T INTERVAL: 342 MS
EKG QRS DURATION: 90 MS
EKG QTC CALCULATION (BAZETT): 434 MS
EKG R AXIS: 70 DEGREES
EKG T AXIS: 74 DEGREES
EKG VENTRICULAR RATE: 97 BPM
GRAM STAIN RESULT: NORMAL

## 2018-08-27 PROCEDURE — 94761 N-INVAS EAR/PLS OXIMETRY MLT: CPT

## 2018-08-27 PROCEDURE — 2700000000 HC OXYGEN THERAPY PER DAY

## 2018-08-27 PROCEDURE — 2580000003 HC RX 258: Performed by: INTERNAL MEDICINE

## 2018-08-27 PROCEDURE — 6360000002 HC RX W HCPCS: Performed by: INTERNAL MEDICINE

## 2018-08-27 PROCEDURE — 6370000000 HC RX 637 (ALT 250 FOR IP): Performed by: INTERNAL MEDICINE

## 2018-08-27 PROCEDURE — 94668 MNPJ CHEST WALL SBSQ: CPT

## 2018-08-27 PROCEDURE — 92526 ORAL FUNCTION THERAPY: CPT

## 2018-08-27 PROCEDURE — 94664 DEMO&/EVAL PT USE INHALER: CPT

## 2018-08-27 PROCEDURE — 2060000000 HC ICU INTERMEDIATE R&B

## 2018-08-27 PROCEDURE — 94640 AIRWAY INHALATION TREATMENT: CPT

## 2018-08-27 PROCEDURE — 94150 VITAL CAPACITY TEST: CPT

## 2018-08-27 PROCEDURE — 99232 SBSQ HOSP IP/OBS MODERATE 35: CPT | Performed by: INTERNAL MEDICINE

## 2018-08-27 RX ORDER — HYDROCODONE BITARTRATE AND ACETAMINOPHEN 5; 325 MG/1; MG/1
1 TABLET ORAL EVERY 4 HOURS PRN
Status: DISCONTINUED | OUTPATIENT
Start: 2018-08-27 | End: 2018-08-28

## 2018-08-27 RX ADMIN — LEVOFLOXACIN 250 MG: 5 INJECTION, SOLUTION INTRAVENOUS at 09:09

## 2018-08-27 RX ADMIN — IPRATROPIUM BROMIDE AND ALBUTEROL SULFATE 1 AMPULE: .5; 3 SOLUTION RESPIRATORY (INHALATION) at 18:49

## 2018-08-27 RX ADMIN — Medication 10 ML: at 09:09

## 2018-08-27 RX ADMIN — IPRATROPIUM BROMIDE AND ALBUTEROL SULFATE 1 AMPULE: .5; 3 SOLUTION RESPIRATORY (INHALATION) at 15:01

## 2018-08-27 RX ADMIN — MAGNESIUM HYDROXIDE 30 ML: 400 SUSPENSION ORAL at 06:25

## 2018-08-27 RX ADMIN — HYDROCODONE BITARTRATE AND ACETAMINOPHEN 1 TABLET: 5; 325 TABLET ORAL at 18:44

## 2018-08-27 RX ADMIN — IPRATROPIUM BROMIDE AND ALBUTEROL SULFATE 1 AMPULE: .5; 3 SOLUTION RESPIRATORY (INHALATION) at 06:59

## 2018-08-27 RX ADMIN — IPRATROPIUM BROMIDE AND ALBUTEROL SULFATE 1 AMPULE: .5; 3 SOLUTION RESPIRATORY (INHALATION) at 23:11

## 2018-08-27 RX ADMIN — Medication 10 ML: at 22:01

## 2018-08-27 RX ADMIN — ENOXAPARIN SODIUM 40 MG: 40 INJECTION SUBCUTANEOUS at 09:09

## 2018-08-27 RX ADMIN — METHYLPREDNISOLONE SODIUM SUCCINATE 40 MG: 40 INJECTION, POWDER, FOR SOLUTION INTRAMUSCULAR; INTRAVENOUS at 13:24

## 2018-08-27 RX ADMIN — METHYLPREDNISOLONE SODIUM SUCCINATE 40 MG: 40 INJECTION, POWDER, FOR SOLUTION INTRAMUSCULAR; INTRAVENOUS at 02:35

## 2018-08-27 ASSESSMENT — PAIN DESCRIPTION - ONSET: ONSET: PROGRESSIVE

## 2018-08-27 ASSESSMENT — PAIN DESCRIPTION - ORIENTATION: ORIENTATION: DISTAL

## 2018-08-27 ASSESSMENT — PAIN DESCRIPTION - FREQUENCY: FREQUENCY: INTERMITTENT

## 2018-08-27 ASSESSMENT — PAIN DESCRIPTION - PROGRESSION: CLINICAL_PROGRESSION: GRADUALLY WORSENING

## 2018-08-27 ASSESSMENT — PAIN DESCRIPTION - PAIN TYPE: TYPE: ACUTE PAIN

## 2018-08-27 ASSESSMENT — PAIN SCALES - GENERAL
PAINLEVEL_OUTOF10: 10
PAINLEVEL_OUTOF10: 3

## 2018-08-27 ASSESSMENT — PAIN DESCRIPTION - LOCATION: LOCATION: BACK

## 2018-08-27 NOTE — PROGRESS NOTES
Pt resting in bed watching TV. Pt c/o SOB on 4L O2 HFNC. Assessment complete-see flowsheet. Medications given-see MAR. Pt asking for cup and efferdent for teeth. Nurse will comply. Pt denies any further needs at this time. Will continue to monitor.

## 2018-08-27 NOTE — PROGRESS NOTES
Progress Note    Admit Date:  8/24/2018    Feeling better. On 4 L. Subjective:  Ms. Brittany Dupont is improving. On 4 L of oxygen. She is on 2 L       Objective:   BP (!) 154/73   Pulse 93   Temp 98.5 °F (36.9 °C) (Oral)   Resp 17   Ht 5' 2\" (1.575 m)   Wt 132 lb 14.4 oz (60.3 kg)   SpO2 91%   Breastfeeding? No   BMI 24.31 kg/m²       Intake/Output Summary (Last 24 hours) at 08/27/18 1041  Last data filed at 08/27/18 0926   Gross per 24 hour   Intake              740 ml   Output             1200 ml   Net             -460 ml         Physical Exam:  General:  Awake, alert, NAD  Skin:  Warm and dry  Neck:  JVD absent. Neck supple  Chest:  Diminished breath sounds, mild rhonchi. + wheezes   Cardiovascular:  RRR ,S1S2 normal  Abdomen:  Soft, non tender, non distended, BS +  Extremities:  No edema. Intact peripheral pulses. Brisk cap refill, < 2 secs  Neuro: non focal      Medications:   Scheduled Meds:   ipratropium-albuterol  1 ampule Inhalation Q4H WA    methylPREDNISolone  40 mg Intravenous Q12H    sodium chloride flush  10 mL Intravenous 2 times per day    enoxaparin  40 mg Subcutaneous Daily    levofloxacin  250 mg Intravenous Q24H       Continuous Infusions:      Data:  CBC:   Recent Labs      08/24/18   1706   WBC  7.5   RBC  4.97   HGB  15.1   HCT  44.1   MCV  88.6   RDW  13.1   PLT  300     BMP:   Recent Labs      08/24/18   1706   NA  131*   K  4.6   CL  91*   CO2  25   BUN  18   CREATININE  0.7     BNP: No results for input(s): BNP in the last 72 hours. PT/INR: No results for input(s): PROTIME, INR in the last 72 hours. APTT: No results for input(s): APTT in the last 72 hours.   CARDIAC ENZYMES:   Recent Labs      08/24/18   1708   TROPONINI  <0.01     FASTING LIPID PANEL:No results found for: CHOL, HDL, TRIG  LIVER PROFILE:   Recent Labs      08/24/18   1706   AST  25   ALT  14   BILITOT  0.8   ALKPHOS  83          XR CHEST STANDARD (2 VW)   Final Result   Pulmonary changes indicate COPD and

## 2018-08-27 NOTE — PLAN OF CARE
Problem: Discharge Planning:  Goal: Participates in care planning  Participates in care planning   Outcome: Ongoing

## 2018-08-28 VITALS
HEIGHT: 62 IN | BODY MASS INDEX: 24.33 KG/M2 | DIASTOLIC BLOOD PRESSURE: 65 MMHG | RESPIRATION RATE: 20 BRPM | SYSTOLIC BLOOD PRESSURE: 141 MMHG | TEMPERATURE: 98.6 F | WEIGHT: 132.2 LBS | OXYGEN SATURATION: 92 % | HEART RATE: 95 BPM

## 2018-08-28 LAB
ANION GAP SERPL CALCULATED.3IONS-SCNC: 8 MMOL/L (ref 3–16)
BANDED NEUTROPHILS RELATIVE PERCENT: 11 % (ref 0–7)
BASOPHILS ABSOLUTE: 0 K/UL (ref 0–0.2)
BASOPHILS RELATIVE PERCENT: 0 %
BUN BLDV-MCNC: 25 MG/DL (ref 7–20)
CALCIUM SERPL-MCNC: 9.3 MG/DL (ref 8.3–10.6)
CHLORIDE BLD-SCNC: 98 MMOL/L (ref 99–110)
CO2: 30 MMOL/L (ref 21–32)
CREAT SERPL-MCNC: 0.6 MG/DL (ref 0.6–1.2)
EOSINOPHILS ABSOLUTE: 0 K/UL (ref 0–0.6)
EOSINOPHILS RELATIVE PERCENT: 0 %
GFR AFRICAN AMERICAN: >60
GFR NON-AFRICAN AMERICAN: >60
GLUCOSE BLD-MCNC: 187 MG/DL (ref 70–99)
HCT VFR BLD CALC: 37.6 % (ref 36–48)
HEMOGLOBIN: 12.8 G/DL (ref 12–16)
LYMPHOCYTES ABSOLUTE: 1.7 K/UL (ref 1–5.1)
LYMPHOCYTES RELATIVE PERCENT: 10 %
MCH RBC QN AUTO: 30 PG (ref 26–34)
MCHC RBC AUTO-ENTMCNC: 34.1 G/DL (ref 31–36)
MCV RBC AUTO: 88 FL (ref 80–100)
METAMYELOCYTES RELATIVE PERCENT: 2 %
MONOCYTES ABSOLUTE: 0.9 K/UL (ref 0–1.3)
MONOCYTES RELATIVE PERCENT: 5 %
MYELOCYTE PERCENT: 2 %
NEUTROPHILS ABSOLUTE: 14.8 K/UL (ref 1.7–7.7)
NEUTROPHILS RELATIVE PERCENT: 70 %
PDW BLD-RTO: 13 % (ref 12.4–15.4)
PLATELET # BLD: 281 K/UL (ref 135–450)
PLATELET SLIDE REVIEW: ADEQUATE
PMV BLD AUTO: 8 FL (ref 5–10.5)
POTASSIUM SERPL-SCNC: 5.1 MMOL/L (ref 3.5–5.1)
RBC # BLD: 4.28 M/UL (ref 4–5.2)
SLIDE REVIEW: ABNORMAL
SODIUM BLD-SCNC: 136 MMOL/L (ref 136–145)
TOXIC GRANULATION: PRESENT
WBC # BLD: 17.4 K/UL (ref 4–11)

## 2018-08-28 PROCEDURE — 94668 MNPJ CHEST WALL SBSQ: CPT

## 2018-08-28 PROCEDURE — 99239 HOSP IP/OBS DSCHRG MGMT >30: CPT | Performed by: INTERNAL MEDICINE

## 2018-08-28 PROCEDURE — 80048 BASIC METABOLIC PNL TOTAL CA: CPT

## 2018-08-28 PROCEDURE — 6370000000 HC RX 637 (ALT 250 FOR IP): Performed by: INTERNAL MEDICINE

## 2018-08-28 PROCEDURE — 94760 N-INVAS EAR/PLS OXIMETRY 1: CPT

## 2018-08-28 PROCEDURE — 97165 OT EVAL LOW COMPLEX 30 MIN: CPT

## 2018-08-28 PROCEDURE — G8987 SELF CARE CURRENT STATUS: HCPCS

## 2018-08-28 PROCEDURE — 6360000002 HC RX W HCPCS: Performed by: INTERNAL MEDICINE

## 2018-08-28 PROCEDURE — G8980 MOBILITY D/C STATUS: HCPCS

## 2018-08-28 PROCEDURE — 97530 THERAPEUTIC ACTIVITIES: CPT

## 2018-08-28 PROCEDURE — G8978 MOBILITY CURRENT STATUS: HCPCS

## 2018-08-28 PROCEDURE — 2580000003 HC RX 258: Performed by: INTERNAL MEDICINE

## 2018-08-28 PROCEDURE — G8989 SELF CARE D/C STATUS: HCPCS

## 2018-08-28 PROCEDURE — 2700000000 HC OXYGEN THERAPY PER DAY

## 2018-08-28 PROCEDURE — 94640 AIRWAY INHALATION TREATMENT: CPT

## 2018-08-28 PROCEDURE — 36415 COLL VENOUS BLD VENIPUNCTURE: CPT

## 2018-08-28 PROCEDURE — 97161 PT EVAL LOW COMPLEX 20 MIN: CPT

## 2018-08-28 PROCEDURE — 97116 GAIT TRAINING THERAPY: CPT

## 2018-08-28 PROCEDURE — 85025 COMPLETE CBC W/AUTO DIFF WBC: CPT

## 2018-08-28 PROCEDURE — G8979 MOBILITY GOAL STATUS: HCPCS

## 2018-08-28 PROCEDURE — 6370000000 HC RX 637 (ALT 250 FOR IP): Performed by: PHYSICIAN ASSISTANT

## 2018-08-28 PROCEDURE — 92526 ORAL FUNCTION THERAPY: CPT

## 2018-08-28 RX ORDER — ACETAMINOPHEN 325 MG/1
650 TABLET ORAL EVERY 4 HOURS PRN
Status: DISCONTINUED | OUTPATIENT
Start: 2018-08-28 | End: 2018-08-28 | Stop reason: HOSPADM

## 2018-08-28 RX ORDER — PREDNISONE 20 MG/1
40 TABLET ORAL DAILY
Status: DISCONTINUED | OUTPATIENT
Start: 2018-08-28 | End: 2018-08-28 | Stop reason: HOSPADM

## 2018-08-28 RX ORDER — LEVOFLOXACIN 500 MG/1
500 TABLET, FILM COATED ORAL DAILY
Qty: 5 TABLET | Refills: 0 | Status: SHIPPED | OUTPATIENT
Start: 2018-08-28 | End: 2018-09-02

## 2018-08-28 RX ORDER — LEVOFLOXACIN 500 MG/1
500 TABLET, FILM COATED ORAL DAILY
Status: DISCONTINUED | OUTPATIENT
Start: 2018-08-28 | End: 2018-08-28 | Stop reason: HOSPADM

## 2018-08-28 RX ORDER — PREDNISONE 10 MG/1
TABLET ORAL
Qty: 30 TABLET | Refills: 0 | Status: SHIPPED | OUTPATIENT
Start: 2018-08-28 | End: 2018-10-17 | Stop reason: ALTCHOICE

## 2018-08-28 RX ADMIN — PREDNISONE 40 MG: 20 TABLET ORAL at 11:05

## 2018-08-28 RX ADMIN — IPRATROPIUM BROMIDE AND ALBUTEROL SULFATE 1 AMPULE: .5; 3 SOLUTION RESPIRATORY (INHALATION) at 10:52

## 2018-08-28 RX ADMIN — IPRATROPIUM BROMIDE AND ALBUTEROL SULFATE 1 AMPULE: .5; 3 SOLUTION RESPIRATORY (INHALATION) at 06:50

## 2018-08-28 RX ADMIN — ENOXAPARIN SODIUM 40 MG: 40 INJECTION SUBCUTANEOUS at 09:19

## 2018-08-28 RX ADMIN — Medication 10 ML: at 09:19

## 2018-08-28 RX ADMIN — ACETAMINOPHEN 650 MG: 325 TABLET ORAL at 11:50

## 2018-08-28 RX ADMIN — METHYLPREDNISOLONE SODIUM SUCCINATE 40 MG: 40 INJECTION, POWDER, FOR SOLUTION INTRAMUSCULAR; INTRAVENOUS at 02:57

## 2018-08-28 RX ADMIN — MAGNESIUM HYDROXIDE 30 ML: 400 SUSPENSION ORAL at 09:19

## 2018-08-28 RX ADMIN — LEVOFLOXACIN 500 MG: 500 TABLET, FILM COATED ORAL at 11:05

## 2018-08-28 ASSESSMENT — PAIN DESCRIPTION - DESCRIPTORS: DESCRIPTORS: RADIATING

## 2018-08-28 ASSESSMENT — PAIN SCALES - GENERAL
PAINLEVEL_OUTOF10: 7
PAINLEVEL_OUTOF10: 3

## 2018-08-28 ASSESSMENT — PAIN DESCRIPTION - LOCATION: LOCATION: BACK

## 2018-08-28 ASSESSMENT — PAIN DESCRIPTION - PROGRESSION: CLINICAL_PROGRESSION: OTHER (COMMENT)

## 2018-08-28 ASSESSMENT — PAIN DESCRIPTION - PAIN TYPE: TYPE: ACUTE PAIN

## 2018-08-28 ASSESSMENT — PAIN DESCRIPTION - FREQUENCY: FREQUENCY: INTERMITTENT

## 2018-08-28 NOTE — FLOWSHEET NOTE
08/27/18 1844   Vital Signs   Patient Currently in Pain Yes   Pain Assessment   Pain Assessment 0-10   Pain Level 10   Pain Type Acute pain   Pain Location Back   Pain Orientation Distal   Pain Frequency Intermittent   Pain Onset Progressive   Clinical Progression Gradually worsening   Pain Intervention(s) Medication (see eMar)  (Norco)       Patient complaining of pain above, called Dr. Nahum Bhandari, new [facility].

## 2018-08-28 NOTE — PROGRESS NOTES
End of shift report given to Latisha Tello RN and Chaparrita Fields RN at bedside. Transfer of care done at this time.

## 2018-08-28 NOTE — PROGRESS NOTES
Inpatient Occupational Therapy  Evaluation and Treatment    Unit: PCU   Date:  8/28/2018  Patient Name:    Alberto Cabrera  Admitting diagnosis:  Acute on chronic respiratory failure with hypoxia [facility]) [J96.21]  Admit Date:  8/24/2018  Precautions/Restrictions/WB Status/ Lines/ Wounds/ Oxygen:2L O2, IV, fall risk, telemetry  Treatment Time: 10:00 -  10:27  Treatment Number:  1      Discharge/Disposition Recommendations:  home with PRN assist and HH OT  AM-PAC Mobility Score: 22  DME needs for discharge: use Rollator and O2     Patient Goals for Therapy:  to go home      Preadmission Environment    Pt. Lives alone; daughter lives next door; pt states her son in law could stay with her 24/7 if needed  Home environment:  one story home. Steps to enter first floor:   3-4 with handrail  Bathroom: tub/shower unit with shower chair and grab bars  Equipment owned: shower chair, Rollator, RW, BSC, wheelchair, hospital bed                O2 related equipment: nocturnal home O2 (2L), HHN, inhaler     Preadmission Status   Pt. Able to drive but does not currently have a car, though her family can drive her places. Pt baby-sits her grandchildren, and states her son in law helps with this  Pt is independent with ADLs, cooking, cleaning, laundry   Pt. Fully independent for transfers and gait and walked with no AD. Reports furniture walking in the morning. Pt has had no recent falls  Pt sleeps in hospital bed  Pt. fully independent with O2 equipment      Pain  None at rest. C/o pain while coughing.      Cognition    A&Ox4, patient appropriate and cooperative. Patient lying supine in bed with no family present. Follows 1 step and 2 step commands.     Pursed Lip Breathing  Pt  required VC and demonstration for PLB technique 50% of the time. Upper Extremity ROM:    WFL, pt able to perform all bed mobility, transfers, and gait without ROM limitation.     Upper Extremity Strength:    Decreased strength B UE 4-/5    Upper

## 2018-08-28 NOTE — PLAN OF CARE
Problem: Falls - Risk of:  Goal: Will remain free from falls  Will remain free from falls   Outcome: Ongoing      Problem: Risk for Impaired Skin Integrity  Goal: Tissue integrity - skin and mucous membranes  Structural intactness and normal physiological function of skin and  mucous membranes.    Outcome: Ongoing    Intervention: TURN PATIENT  Turns self      Problem: Airway Clearance - Ineffective:  Goal: Clear lung sounds  Clear lung sounds   Outcome: Ongoing  Rhonchi and crackles in bilateral lung fields    Problem: Pain:  Goal: Pain level will decrease  Pain level will decrease   Outcome: Ongoing  PRN Norco

## 2018-08-28 NOTE — PROGRESS NOTES
Patient ready for discharge. Daughter present for education and to receive prescriptions. Patient escorted to front lobby via wheelchair by this RN. Home O2 present and connected to patient. Patient stable upon departure.   Carolyne Jacobs RN

## 2018-08-28 NOTE — PROGRESS NOTES
rationale for further evaluation, and s/s of aspiration to be aware of given hx. Reviewed aspiration precautions. Treatment/Goals  Short-term Goals  Timeframe for Short-term Goals: 1 week  Goal 1: Patient will tolerate regular solids and thin liquids with no overt s/s of asp/pen on 100% of observed swallows. 08/28 ongoing, pt tolerating regular solids/thin liquids by straw with no overt s/s of aspiration; GOAL MET  Goal 2: Patient will demonstrate knowledge/use of safe swallow strategies given independence with 90% accuracy. 08/28 ongoing, pt demonstrated use of recommended safe swallowing, independently across session. GOAL MET  Goal 3: Patient will complete laryngeal/pharyngeal strengthening exercises with 90% accuracy given max cues. 08/28 did not target this date. GOAL NOT MET  Long-term Goals  Timeframe for Long-term Goals: 1 week  Goal 1: Patient will tolerate least restrictive diet as demonstrated by no overt s/s of asp/pen on 100% of observed swallows. 08/28 see above GOAL MET     Recommendations:  Regular diet and thin liquids per MD orders    Patient/Family/Caregiver Education:  SLP explained scope of practice, safe swallowing precautions, s/s of aspiration    Compensatory Strategies:  · Remain upright for 30-45 minutes after meals  · Upright as possible for all oral intake  · Eat/Feed slowly  · Alternate solids and liquids  · Small bites/sips      Plan:    Continued Dysphagia treatment with goals per plan of care. Discharge Recommendations:  Pt will not require dysphagia therapy upon discharge  As above goals have been met, this note serves as tx and discharge summary. Pt has been advised of recommendations for diet/liquids as well as aspiration precautions and signs/symptoms of aspiration. Pt with no further questions or needs from speech/swallowing therapy at this time.      Total Treatment Time:  6875-4910, 12 min  Dysphagia Tx    Villa Melendez M.A., LEV [place]. [id number]  Speech Language

## 2018-08-28 NOTE — CARE COORDINATION
DISCHARGE ORDER  Date/Time 2018 10:44 AM  Completed by: Gio Pinon, Case Management    Patient Name: Robyn Johnson    : 1934  Admitting Diagnosis: Acute on chronic respiratory failure with hypoxia ([facility] [address] [J96.21]  Admit Date/Time: 2018  4:45 PM    Noted discharge order. Confirmed discharge plan with patient / family (pt): Yes   Discharge Plan: Chart reviewed and role of dcp explained. Pt is from home alone, dtr and taylor live next door and multiple family members live close by and are supportive. Pt states her taylor could stay with her if needed. Pt is returning [facility] and pt would like to use [facility] for SN/PT. TC to [facility], spoke with Victorina Slaughter, she can accept the pt for SN/PT. Home care order, AVS and pertinent notes faxed to [facility] at this time @ [phone]. Pt is increasing from 2L to 4L, pt states family to bring in her portable conserving device and she has a working concentrator at home. TC to [facility] to make them aware and all needed documentation including the order faxed to them at this time @[phone]. No further dc needs voiced or identified. Discharge timeout done  with Glory Norris RN. All discharge needs and concerns addressed.

## 2018-08-28 NOTE — PROGRESS NOTES
Acute Care COPD Physical Therapy Evaluation and Treatment    Unit: PCU   Date:  8/28/2018  Patient Name:    Yelena Mccracken  Admitting diagnosis:  Acute on chronic respiratory failure with hypoxia [facility]) [J96.21]  Admit Date:  8/24/2018  Precautions/Restrictions/WB Status/ Lines/ Wounds/ Oxygen:  2L O2, IV, fall risk, telemetry  Treatment Time: 10:00 -  10:25  Treatment Number:  1     Discharge/Disposition Recommendations:  Home with PRN assist, home PT  AM-PAC Mobility Score: 22  DME needs for discharge: pulse oximeter     Other Therapy Recommendations/Needs: use RW or rollator until discontinued by home PT      Patient Goals for Therapy:  Marc Genna go home     Preadmission Environment    Pt. Lives alone; daughter lives next door; pt states her son in law could stay with her 24/7 if needed  Home environment:  one story home. Steps to enter first floor:   3-4 with handrail  Bathroom: tub/shower unit with shower chair and grab bars  Equipment owned: shower chair, Rollator, RW, BSC, wheelchair, hospital bed     O2 related equipment: nocturnal home O2 (2L), HHN, inhaler    Preadmission Status   Pt. Able to drive but does not currently have a car, though her family can drive her places. Pt baby-sits her grandchildren, and states her son in law helps with this  Pt is independent with ADLs, cooking, cleaning, laundry   Pt. Fully independent for transfers and gait and walked with no AD. Reports furniture walking in the morning. Pt has had no recent falls  Pt sleeps in hospital bed  Pt. fully independent with O2 equipment     Pain  None at rest. C/o pain while coughing. Cognition    A&Ox4, patient appropriate and cooperative. Patient lying supine in bed with no family present. Follows 1 step and 2 step commands. Pursed Lip Breathing  Pt required VC for PLB technique 50% of the time. Upper Extremity ROM/Strength  Please see OT evaluation.       Lower Extremity ROM / Strength    AROM WFL   BLE strength WFL  Lower Extremity Sensation    No apparent deficits. Lower Extremity Proprioception:   No apparent deficits. Coordination and Tone  WNL    Balance  Static Sitting: good  Dynamic Sitting: good  Static Standing: good   Dynamic Standing: fair (+)    Bed mobility    Supine to sit: mod I  Sit to supine: NT  Scooting to head of bed: IND  Scooting in sitting: IND  Rolling: NT    Transfers    Sit to stand: IND from EOB, chair  Stand to sit: IND to chair     Gait    Ambulated 75' with no AD and CGA to min A + 25' with RW and SBA to supervision. Gait deviations included: slow modesta, narrow SILVANO, unsteady with 2-3 LOB (without AD), short step length   SpO2: 85% on 2L (first trial), 84% on 2L (second trial)  # Rest Breaks taken: 1 seated between bouts    Activity Tolerance   SpO2: 88-91% sitting EOB on 2L   84-85% ambulating on 2L   recovered to 90% within 1 min seated rest with each desaturation   HR 120bpm post ambulation   Mild to moderate BERUMEN    Positioning Needs   Pt in chair, call light and needs in reach. Pt instructed to call for assist with ambulation. Exercises Initiated    [facility]     Educated on role of PT, DC recommendations, POC, and safety awareness   Assessment of Deficits Walking [id number]  Pt demonstrated decreased activity tolerance, decreased safety awareness, decreased balance, and decreased independence with gait. At this time, pt is discharged from the hospital and is recommended to follow up with home PT/OT to safely progress functional mobility and activity tolerance back to baseline level . Pt. Limited during evaluation by BERUMEN, O2 demands, fatigue   At end of evaluation, pt. In chair, with call light and needs within reach. Plan    DC acute PT upon DC from hospital. Goals & POC to be set if pt remains in house.       Timed Code Treatment Minutes:  15 minutes  Total Treatment Time:  25 minutes    Spencer Phelan, PT, DPT #[id number]     If patient discharges from this

## 2018-08-28 NOTE — PROGRESS NOTES
Pt resting in bed comfortably watching TV. States she feels better today and that the PRN norco really helped her pain. VSS. Assessment complete-see flowsheet. Pt asking for more ice. No further needs expressed. Call light and bedside table within reach. Will continue to monitor.

## 2018-08-28 NOTE — PROGRESS NOTES
RESPIRATORY THERAPY ASSESSMENT    Name:  Ignacia Calderón Record Number:  [id number]  Age: 80 y.o. Gender: female  : 1934  Today's Date:  2018  Room:  /0307-01    Assessment     Is the patient being admitted for a COPD or Asthma exacerbation? No   (If yes the patient will be seen every 4 hours for the first 24 hours and then reassessed)    Patient Admission Diagnosis      Allergies  No Known Allergies    Minimum Predicted Vital Capacity:     748          Actual Vital Capacity:      970          Pulmonary History:emphysema  Home Oxygen Therapy:  2L at hs  Home Respiratory Therapy:anoro ellipta daily   Current Respiratory Therapy:  duoneb q4wa  Treatment Type: HHN, Vibratory Mucous Clearing Therapy or Intervention  Medications: Albuterol/Ipratropium    Respiratory Severity Index(RSI)   Patients with orders for inhalation medications, oxygen, or any therapeutic treatment modality will be placed on Respiratory Protocol. They will be assessed with the first treatment and at least every 72 hours thereafter. The following severity scale will be used to determine frequency of treatment intervention.     Smoking History: Pulmonary Disease or Smoking History, Greater than 15 pack year = 2    Social History  Social History   Substance Use Topics    Smoking status: Former Smoker     Years: 30.00     Quit date: 2012    Smokeless tobacco: Never Used    Alcohol use No       Recent Surgical History: None = 0  Past Surgical History  Past Surgical History:   Procedure Laterality Date    ECTOPIC PREGNANCY SURGERY      EYE SURGERY Left 14    Left Eye Catararact    JOINT REPLACEMENT  12    BILATERAL HIP REPLACEMENT    JOINT REPLACEMENT      bilaterally    OTHER SURGICAL HISTORY  2012    right total hip replacement    TUBAL LIGATION         Level of Consciousness: Alert, Oriented, and Cooperative = 0    Level of Activity: Walking unassisted = 0    Respiratory Pattern: Regular Pattern; RR 8-20 = 0    Breath Sounds: Diminshed bilaterally and/or crackles = 2    Sputum  Sputum Color: White, Yellow, Tenacity: Thick, Sputum How Obtained: None  Cough: Strong, spontaneous, non-productive = 0    Vital Signs   /76   Pulse 96   Temp 98.1 °F (36.7 °C) (Oral)   Resp 20   Ht 5' 2\" (1.575 m)   Wt 132 lb 14.4 oz (60.3 kg)   SpO2 93%   Breastfeeding? No   BMI 24.31 kg/m²   SPO2 (COPD values may differ): 86-87% on room air or greater than 92% on FiO2 35- 50% = 3    Peak Flow (asthma only): not applicable = 0    RSI: 7-8 = BID and Q4HPRN (every four hours as needed) for dyspnea        Plan       Goals: mobilize retained secretions and volume expansion    Patient/caregiver was educated on the proper method of use for Respiratory Care Devices:  Yes      Level of patient/caregiver understanding able to:   [] Verbalize understanding   [] Demonstrate understanding       [] Teach back        [] Needs reinforcement       []  No available caregiver               []  Other:     Response to education:  Good     Is patient being placed on Home Treatment Regimen? No     Does the patient have everything they need prior to discharge? NA     Comments: chart reviewed and patient assessed    Plan of Care: keep duoneb q4wa for sob    Electronically signed by Leesa Palacio RCP on 8/27/2018 at 9:30 PM    Respiratory Protocol Guidelines     1. Assessment and treatment by Respiratory Therapy will be initiated for medication and therapeutic interventions upon initiation of aerosolized medication. 2. Physician will be contacted for respiratory rate (RR) greater than 35 breaths per minute. Therapy will be held for heart rate (HR) greater than 140 beats per minute, pending direction from physician. 3. Bronchodilators will be administered via Metered Dose Inhaler (MDI) with spacer when the following criteria are met:  a.  Alert and cooperative     b. HR < 140 bpm  c. RR < 30 bpm                d. Can demonstrate a discontinuation.

## 2018-08-29 LAB
BLOOD CULTURE, ROUTINE: NORMAL
CULTURE, BLOOD 2: NORMAL

## 2018-08-30 NOTE — DISCHARGE SUMMARY
98*   CO2  30   BUN  25*   CREATININE  0.6     CULTURES    Blood cx x2: NGTD     Resp cx: NRF, 2+ WBCs, 1+ yeast, 1+ GP rods    RADIOLOGY    XR CHEST STANDARD (2 VW) 8/26/2018   Final Result   Pulmonary changes indicate COPD and chronic interstitial lung disease. No   definite acute findings although superimposed edema can not be excluded. Discharge Medications     Medication List      START taking these medications    levofloxacin 500 MG tablet  Commonly known as:  LEVAQUIN  Take 1 tablet by mouth daily for 5 days     predniSONE 10 MG tablet  Commonly known as:  DELTASONE  4 tabs for 3 days 3 tabs for 3 days 2 tabs for 3 days 1 tabs for 3 days        CONTINUE taking these medications    acetaminophen 500 MG tablet  Commonly known as:  TYLENOL     ANORO ELLIPTA 62.5-25 MCG/INH Aepb inhaler  Generic drug:  umeclidinium-vilanterol     aspirin 81 MG tablet     MUCINEX 600 MG extended release tablet  Generic drug:  guaiFENesin     therapeutic multivitamin-minerals tablet     Vitamin D3 2000 units Caps        STOP taking these medications    CALCIUM + D PO           Where to Get Your Medications      You can get these medications from any pharmacy    Bring a paper prescription for each of these medications  · levofloxacin 500 MG tablet  · predniSONE 10 MG tablet           Discharged in stable condition to home. Follow Up: Follow up with PCP in 1 week. 30 mts spent.     Emry Deal 9/3/2018 9:19 AM

## 2018-10-17 ENCOUNTER — OFFICE VISIT (OUTPATIENT)
Dept: PULMONOLOGY | Age: 83
End: 2018-10-17
Payer: MEDICARE

## 2018-10-17 VITALS
OXYGEN SATURATION: 93 % | HEART RATE: 81 BPM | DIASTOLIC BLOOD PRESSURE: 68 MMHG | WEIGHT: 131 LBS | BODY MASS INDEX: 24.11 KG/M2 | TEMPERATURE: 98.4 F | RESPIRATION RATE: 16 BRPM | SYSTOLIC BLOOD PRESSURE: 122 MMHG | HEIGHT: 62 IN

## 2018-10-17 DIAGNOSIS — R06.02 SOB (SHORTNESS OF BREATH): ICD-10-CM

## 2018-10-17 DIAGNOSIS — J96.11 CHRONIC RESPIRATORY FAILURE WITH HYPOXIA (HCC): ICD-10-CM

## 2018-10-17 DIAGNOSIS — R93.89 ABNORMAL CXR: Primary | ICD-10-CM

## 2018-10-17 PROCEDURE — G8420 CALC BMI NORM PARAMETERS: HCPCS | Performed by: INTERNAL MEDICINE

## 2018-10-17 PROCEDURE — 4040F PNEUMOC VAC/ADMIN/RCVD: CPT | Performed by: INTERNAL MEDICINE

## 2018-10-17 PROCEDURE — 1036F TOBACCO NON-USER: CPT | Performed by: INTERNAL MEDICINE

## 2018-10-17 PROCEDURE — 99205 OFFICE O/P NEW HI 60 MIN: CPT | Performed by: INTERNAL MEDICINE

## 2018-10-17 PROCEDURE — G8400 PT W/DXA NO RESULTS DOC: HCPCS | Performed by: INTERNAL MEDICINE

## 2018-10-17 PROCEDURE — 1090F PRES/ABSN URINE INCON ASSESS: CPT | Performed by: INTERNAL MEDICINE

## 2018-10-17 PROCEDURE — G8482 FLU IMMUNIZE ORDER/ADMIN: HCPCS | Performed by: INTERNAL MEDICINE

## 2018-10-17 PROCEDURE — 1123F ACP DISCUSS/DSCN MKR DOCD: CPT | Performed by: INTERNAL MEDICINE

## 2018-10-17 PROCEDURE — 1101F PT FALLS ASSESS-DOCD LE1/YR: CPT | Performed by: INTERNAL MEDICINE

## 2018-10-17 PROCEDURE — G8427 DOCREV CUR MEDS BY ELIG CLIN: HCPCS | Performed by: INTERNAL MEDICINE

## 2018-10-17 RX ORDER — ALBUTEROL SULFATE 2.5 MG/3ML
SOLUTION RESPIRATORY (INHALATION)
Refills: 1 | COMMUNITY
Start: 2018-10-02

## 2018-10-17 NOTE — PROGRESS NOTES
Chief Complaint: copd    Consulting provider: Jose Ramon Dyson, *    HPI: 80 y.o. female patient is being seen at the request of [address], Deedee Boeck, * for a consultation regarding COPD. The patient has a history of hospital admission for bronchitis and COPD in 8/2018. The patient does not have SOB at rest but has BERUMEN. Exercise tolerance on level ground at a slow pace is not limited. The patient has a less than  daily intermittent cough that is usually dry. The patient does wheeze intermittently less than daily. She has been in the hospital once in the last 3 years. She does get bronchitis 1-2x every year. She is worse with changes in the weather including cold weather or humid weather. She does not take her inhaler every day. She does not not much difference on the days that she takes her inhaler. Same with her nebs which she uses 1-2 x /day    The patient has smoked 1 PPD for 40 years. Quit in 2012. Patient worked at an [employer]    The information above included the review and summarization of old records. The history was obtained from these old records and from the patient directly. Outside information from Dr. Nisha Meyers regarding COPD was reviewed. Started Trelegy last month. REVIEW OF SYSTEMS:    See HPI and scanned Review of Systems sheet. Past Medical History:   Diagnosis Date    [facility])     skin cancer     Chronic respiratory failure (HCC)     Emphysema     Left THR 2/21/2012    Osteoporosis     Pneumonia      Past Surgical History      Procedure Laterality Date    ECTOPIC PREGNANCY SURGERY      EYE SURGERY Left 6/26/14    Left Eye Catararact    JOINT REPLACEMENT  2/20/12    BILATERAL HIP REPLACEMENT    JOINT REPLACEMENT      bilaterally    OTHER SURGICAL HISTORY  5-    right total hip replacement    TUBAL LIGATION       Social History:    TOBACCO:   reports that she quit smoking about 6 years ago. She has a 30.00 pack-year smoking history.  She has never used

## 2018-10-30 ENCOUNTER — HOSPITAL ENCOUNTER (OUTPATIENT)
Dept: CT IMAGING | Age: 83
Discharge: HOME | End: 2018-10-30
Payer: MEDICARE

## 2018-10-30 ENCOUNTER — HOSPITAL ENCOUNTER (OUTPATIENT)
Dept: PULMONOLOGY | Age: 83
Discharge: HOME | End: 2018-10-30
Payer: MEDICARE

## 2018-10-30 DIAGNOSIS — R93.89 ABNORMAL CXR: ICD-10-CM

## 2018-10-30 DIAGNOSIS — R06.02 SOB (SHORTNESS OF BREATH): ICD-10-CM

## 2018-10-30 PROCEDURE — 94618 PULMONARY STRESS TESTING: CPT

## 2018-10-30 PROCEDURE — 71250 CT THORAX DX C-: CPT

## 2018-10-30 PROCEDURE — 6360000002 HC RX W HCPCS: Performed by: INTERNAL MEDICINE

## 2018-10-30 PROCEDURE — 94060 EVALUATION OF WHEEZING: CPT

## 2018-10-30 PROCEDURE — 94664 DEMO&/EVAL PT USE INHALER: CPT

## 2018-10-30 PROCEDURE — 94729 DIFFUSING CAPACITY: CPT

## 2018-10-30 PROCEDURE — 94726 PLETHYSMOGRAPHY LUNG VOLUMES: CPT

## 2018-10-30 PROCEDURE — 94640 AIRWAY INHALATION TREATMENT: CPT

## 2018-10-30 RX ORDER — ALBUTEROL SULFATE 2.5 MG/3ML
2.5 SOLUTION RESPIRATORY (INHALATION) ONCE
Status: COMPLETED | OUTPATIENT
Start: 2018-10-30 | End: 2018-10-30

## 2018-10-30 RX ADMIN — ALBUTEROL SULFATE 2.5 MG: 2.5 SOLUTION RESPIRATORY (INHALATION) at 11:03

## 2018-11-02 ENCOUNTER — OFFICE VISIT (OUTPATIENT)
Dept: PULMONOLOGY | Age: 83
End: 2018-11-02
Payer: MEDICARE

## 2018-11-02 VITALS
HEIGHT: 62 IN | OXYGEN SATURATION: 94 % | DIASTOLIC BLOOD PRESSURE: 70 MMHG | BODY MASS INDEX: 24.11 KG/M2 | RESPIRATION RATE: 16 BRPM | WEIGHT: 131 LBS | HEART RATE: 84 BPM | SYSTOLIC BLOOD PRESSURE: 132 MMHG | TEMPERATURE: 98.3 F

## 2018-11-02 DIAGNOSIS — R93.89 ABNORMAL CT OF THE CHEST: Primary | ICD-10-CM

## 2018-11-02 DIAGNOSIS — J44.9 CHRONIC OBSTRUCTIVE PULMONARY DISEASE, UNSPECIFIED COPD TYPE (HCC): ICD-10-CM

## 2018-11-02 DIAGNOSIS — J18.9 COMMUNITY ACQUIRED PNEUMONIA, UNSPECIFIED LATERALITY: ICD-10-CM

## 2018-11-02 PROCEDURE — 99214 OFFICE O/P EST MOD 30 MIN: CPT | Performed by: INTERNAL MEDICINE

## 2018-11-02 PROCEDURE — 1123F ACP DISCUSS/DSCN MKR DOCD: CPT | Performed by: INTERNAL MEDICINE

## 2018-11-02 PROCEDURE — 1101F PT FALLS ASSESS-DOCD LE1/YR: CPT | Performed by: INTERNAL MEDICINE

## 2018-11-02 PROCEDURE — G8420 CALC BMI NORM PARAMETERS: HCPCS | Performed by: INTERNAL MEDICINE

## 2018-11-02 PROCEDURE — G8400 PT W/DXA NO RESULTS DOC: HCPCS | Performed by: INTERNAL MEDICINE

## 2018-11-02 PROCEDURE — 1036F TOBACCO NON-USER: CPT | Performed by: INTERNAL MEDICINE

## 2018-11-02 PROCEDURE — G8926 SPIRO NO PERF OR DOC: HCPCS | Performed by: INTERNAL MEDICINE

## 2018-11-02 PROCEDURE — 4040F PNEUMOC VAC/ADMIN/RCVD: CPT | Performed by: INTERNAL MEDICINE

## 2018-11-02 PROCEDURE — 3023F SPIROM DOC REV: CPT | Performed by: INTERNAL MEDICINE

## 2018-11-02 PROCEDURE — 1090F PRES/ABSN URINE INCON ASSESS: CPT | Performed by: INTERNAL MEDICINE

## 2018-11-02 PROCEDURE — G8482 FLU IMMUNIZE ORDER/ADMIN: HCPCS | Performed by: INTERNAL MEDICINE

## 2018-11-02 PROCEDURE — G8427 DOCREV CUR MEDS BY ELIG CLIN: HCPCS | Performed by: INTERNAL MEDICINE

## 2018-11-02 RX ORDER — LEVOFLOXACIN 750 MG/1
750 TABLET ORAL DAILY
Qty: 7 TABLET | Refills: 0 | Status: SHIPPED | OUTPATIENT
Start: 2018-11-02 | End: 2018-11-09

## 2018-11-03 NOTE — PROCEDURES
[address]                 [address]                               PULMONARY FUNCTION    PATIENT NAME: Stacia Schmid                    :        1934  MED REC NO:   [id number]                          ROOM:  ACCOUNT NO:   [de-identified]                           ADMIT DATE: 10/30/2018  PROVIDER:     Mic Nunez MD    DATE OF PROCEDURE:  10/30/2018    ATTENDING:  Mic Nunez MD    INDICATION:  Shortness of breath. FINDINGS:  1. SPIROMETRY:  The FVC is 91% of predicted. The FEV1 is 1.39 liters  which is 86% of predicted. The FEV1/FVC ratio is 8.70. There is no  response to bronchodilators. The postbronchodilator FEV1/FVC ratio is  0.67. 2.  PLETHYSMOGRAPHY:  The total lung capacity is 98% of predicted. 3.  The diffusion capacity of carbon monoxide is 45% of predicted. 4.  The flow volume loop is borderline for obstructive pattern. IMPRESSION:  This patient has mild COPD with normal lung volumes and  severely decreased DLCO. A six-minute walk test was conducted per [facility] protocol. The  patient ambulated 840 feet on room air. The patient had a mild  oxyhemoglobin desaturation from 96% at rest to 93%. No supplemental  oxygen is indicated.         Ashwini Kendrick MD    D: 2018 15:30:49       T: 2018 0:20:41     [email or web address]  Job#: [id number]     Doc#: [id number]    CC:

## 2019-01-08 ENCOUNTER — HOSPITAL ENCOUNTER (OUTPATIENT)
Dept: CT IMAGING | Age: 84
Discharge: HOME | End: 2019-01-08
Payer: MEDICARE

## 2019-01-08 ENCOUNTER — OFFICE VISIT (OUTPATIENT)
Dept: PULMONOLOGY | Age: 84
End: 2019-01-08
Payer: MEDICARE

## 2019-01-08 ENCOUNTER — HOSPITAL ENCOUNTER (OUTPATIENT)
Age: 84
Discharge: HOME | End: 2019-01-08
Payer: MEDICARE

## 2019-01-08 VITALS
HEIGHT: 61 IN | HEART RATE: 72 BPM | WEIGHT: 126.2 LBS | BODY MASS INDEX: 23.83 KG/M2 | SYSTOLIC BLOOD PRESSURE: 124 MMHG | TEMPERATURE: 98.1 F | OXYGEN SATURATION: 90 % | DIASTOLIC BLOOD PRESSURE: 64 MMHG | RESPIRATION RATE: 20 BRPM

## 2019-01-08 DIAGNOSIS — R93.89 ABNORMAL CT OF THE CHEST: ICD-10-CM

## 2019-01-08 DIAGNOSIS — R93.89 ABNORMAL CT OF THE CHEST: Primary | ICD-10-CM

## 2019-01-08 DIAGNOSIS — J44.1 COPD EXACERBATION (HCC): ICD-10-CM

## 2019-01-08 DIAGNOSIS — J96.11 CHRONIC RESPIRATORY FAILURE WITH HYPOXIA (HCC): ICD-10-CM

## 2019-01-08 LAB
BUN BLDV-MCNC: 25 MG/DL (ref 7–20)
CREAT SERPL-MCNC: 1.1 MG/DL (ref 0.6–1.2)
GFR AFRICAN AMERICAN: 57
GFR NON-AFRICAN AMERICAN: 47

## 2019-01-08 PROCEDURE — G8926 SPIRO NO PERF OR DOC: HCPCS | Performed by: INTERNAL MEDICINE

## 2019-01-08 PROCEDURE — 1123F ACP DISCUSS/DSCN MKR DOCD: CPT | Performed by: INTERNAL MEDICINE

## 2019-01-08 PROCEDURE — G8400 PT W/DXA NO RESULTS DOC: HCPCS | Performed by: INTERNAL MEDICINE

## 2019-01-08 PROCEDURE — 1101F PT FALLS ASSESS-DOCD LE1/YR: CPT | Performed by: INTERNAL MEDICINE

## 2019-01-08 PROCEDURE — G8420 CALC BMI NORM PARAMETERS: HCPCS | Performed by: INTERNAL MEDICINE

## 2019-01-08 PROCEDURE — G8482 FLU IMMUNIZE ORDER/ADMIN: HCPCS | Performed by: INTERNAL MEDICINE

## 2019-01-08 PROCEDURE — 99214 OFFICE O/P EST MOD 30 MIN: CPT | Performed by: INTERNAL MEDICINE

## 2019-01-08 PROCEDURE — 4040F PNEUMOC VAC/ADMIN/RCVD: CPT | Performed by: INTERNAL MEDICINE

## 2019-01-08 PROCEDURE — 6360000004 HC RX CONTRAST MEDICATION: Performed by: INTERNAL MEDICINE

## 2019-01-08 PROCEDURE — 3023F SPIROM DOC REV: CPT | Performed by: INTERNAL MEDICINE

## 2019-01-08 PROCEDURE — 1036F TOBACCO NON-USER: CPT | Performed by: INTERNAL MEDICINE

## 2019-01-08 PROCEDURE — 82565 ASSAY OF CREATININE: CPT

## 2019-01-08 PROCEDURE — 84520 ASSAY OF UREA NITROGEN: CPT

## 2019-01-08 PROCEDURE — 1090F PRES/ABSN URINE INCON ASSESS: CPT | Performed by: INTERNAL MEDICINE

## 2019-01-08 PROCEDURE — G8427 DOCREV CUR MEDS BY ELIG CLIN: HCPCS | Performed by: INTERNAL MEDICINE

## 2019-01-08 PROCEDURE — 36415 COLL VENOUS BLD VENIPUNCTURE: CPT

## 2019-01-08 PROCEDURE — 71260 CT THORAX DX C+: CPT

## 2019-01-08 RX ORDER — PREDNISONE 10 MG/1
TABLET ORAL
Refills: 0 | COMMUNITY
Start: 2019-01-02 | End: 2019-07-05 | Stop reason: CLARIF

## 2019-01-08 RX ORDER — LEVOFLOXACIN 500 MG/1
TABLET, FILM COATED ORAL
Refills: 0 | COMMUNITY
Start: 2019-01-02 | End: 2019-07-05 | Stop reason: CLARIF

## 2019-01-08 RX ORDER — ALBUTEROL SULFATE 90 UG/1
2 AEROSOL, METERED RESPIRATORY (INHALATION) EVERY 6 HOURS PRN
COMMUNITY

## 2019-01-08 RX ORDER — PSEUDOEPHEDRINE HYDROCHLORIDE 30 MG/1
30 TABLET ORAL EVERY 6 HOURS PRN
Qty: 28 TABLET | Refills: 0 | Status: SHIPPED | OUTPATIENT
Start: 2019-01-08 | End: 2020-01-08

## 2019-01-08 RX ADMIN — IOPAMIDOL 75 ML: 755 INJECTION, SOLUTION INTRAVENOUS at 10:46

## 2019-05-15 ENCOUNTER — HOSPITAL ENCOUNTER (OUTPATIENT)
Dept: CT IMAGING | Age: 84
Discharge: HOME | End: 2019-05-15
Payer: MEDICARE

## 2019-05-15 DIAGNOSIS — J43.9 PULMONARY EMPHYSEMA, UNSPECIFIED EMPHYSEMA TYPE (HCC): ICD-10-CM

## 2019-05-15 DIAGNOSIS — J18.9 PNEUMONIA OF BOTH LOWER LOBES DUE TO INFECTIOUS ORGANISM: ICD-10-CM

## 2019-05-15 PROCEDURE — 71250 CT THORAX DX C-: CPT

## 2019-07-05 ENCOUNTER — OFFICE VISIT (OUTPATIENT)
Dept: PULMONOLOGY | Age: 84
End: 2019-07-05
Payer: MEDICARE

## 2019-07-05 VITALS
DIASTOLIC BLOOD PRESSURE: 68 MMHG | BODY MASS INDEX: 23.79 KG/M2 | TEMPERATURE: 97.9 F | WEIGHT: 126 LBS | SYSTOLIC BLOOD PRESSURE: 128 MMHG | HEIGHT: 61 IN | OXYGEN SATURATION: 98 % | RESPIRATION RATE: 16 BRPM | HEART RATE: 67 BPM

## 2019-07-05 DIAGNOSIS — J44.9 CHRONIC OBSTRUCTIVE PULMONARY DISEASE, UNSPECIFIED COPD TYPE (HCC): ICD-10-CM

## 2019-07-05 DIAGNOSIS — J18.9 COMMUNITY ACQUIRED PNEUMONIA, UNSPECIFIED LATERALITY: ICD-10-CM

## 2019-07-05 DIAGNOSIS — R93.89 ABNORMAL CT OF THE CHEST: Primary | ICD-10-CM

## 2019-07-05 PROCEDURE — G8926 SPIRO NO PERF OR DOC: HCPCS | Performed by: INTERNAL MEDICINE

## 2019-07-05 PROCEDURE — 1036F TOBACCO NON-USER: CPT | Performed by: INTERNAL MEDICINE

## 2019-07-05 PROCEDURE — G8400 PT W/DXA NO RESULTS DOC: HCPCS | Performed by: INTERNAL MEDICINE

## 2019-07-05 PROCEDURE — G8420 CALC BMI NORM PARAMETERS: HCPCS | Performed by: INTERNAL MEDICINE

## 2019-07-05 PROCEDURE — 1090F PRES/ABSN URINE INCON ASSESS: CPT | Performed by: INTERNAL MEDICINE

## 2019-07-05 PROCEDURE — G8427 DOCREV CUR MEDS BY ELIG CLIN: HCPCS | Performed by: INTERNAL MEDICINE

## 2019-07-05 PROCEDURE — 3023F SPIROM DOC REV: CPT | Performed by: INTERNAL MEDICINE

## 2019-07-05 PROCEDURE — 99214 OFFICE O/P EST MOD 30 MIN: CPT | Performed by: INTERNAL MEDICINE

## 2019-07-05 PROCEDURE — 1123F ACP DISCUSS/DSCN MKR DOCD: CPT | Performed by: INTERNAL MEDICINE

## 2019-07-05 PROCEDURE — 4040F PNEUMOC VAC/ADMIN/RCVD: CPT | Performed by: INTERNAL MEDICINE

## 2019-07-05 RX ORDER — AMLODIPINE BESYLATE 5 MG/1
TABLET ORAL
Refills: 4 | COMMUNITY
Start: 2019-06-04

## 2019-07-05 NOTE — PROGRESS NOTES
[facility]    Outpatient Follow Up Note    Chief Complaint: COPD  Consulting provider: Ximena Guzman, *    Interval History: 80 y.o. female No cough or wheeze. No illness since last visit. Not using rescue inhaler. Taking inhalers as prescribed    Initial HPI:regarding COPD. The patient has a history of hospital admission for bronchitis and COPD in 8/2018. The patient does not have SOB at rest but has BERUMEN. Exercise tolerance on level ground at a slow pace is not limited. The patient has a less than  daily intermittent cough that is usually dry. The patient does wheeze intermittently less than daily. She has been in the hospital once in the last 3 years. She does get bronchitis 1-2x every year. She is worse with changes in the weather including cold weather or humid weather. She does not take her inhaler every day. She does not not much difference on the days that she takes her inhaler. Same with her nebs which she uses 1-2 x /day     The patient has smoked 1 PPD for 40 years. Quit in 2012.     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      Current Outpatient Medications:     albuterol sulfate HFA (VENTOLIN HFA) 108 (90 Base) MCG/ACT inhaler, Inhale 2 puffs into the lungs every 6 hours as needed for Wheezing, Disp: , Rfl:     Umeclidinium Bromide (INCRUSE ELLIPTA) 62.5 MCG/INH AEPB, 1 inhalation daily, Disp: 1 each, Rfl: 5    pseudoephedrine (DECONGESTANT) 30 MG tablet, Take 1 tablet by mouth every 6 hours as needed for Congestion, Disp: 28 tablet, Rfl: 0    albuterol (PROVENTIL) (2.5 MG/3ML) 0.083% nebulizer solution, , Disp: , Rfl: 1    OXYGEN, Inhale into the lungs, Disp: , Rfl:     aspirin 81 MG tablet, Take 81 mg by mouth daily, Disp: , Rfl:     Cholecalciferol (VITAMIN D3) 2000 units CAPS, Take by mouth, Disp: , Rfl:     acetaminophen (TYLENOL) 500 MG tablet, Take 500 mg by mouth every 6 hours as needed for Pain, Disp: , Rfl:     therapeutic multivitamin-minerals

## 2020-01-08 ENCOUNTER — TELEPHONE (OUTPATIENT)
Dept: PULMONOLOGY | Age: 85
End: 2020-01-08

## 2020-02-27 ENCOUNTER — TELEPHONE (OUTPATIENT)
Dept: PULMONOLOGY | Age: 85
End: 2020-02-27

## 2020-03-19 ENCOUNTER — TELEPHONE (OUTPATIENT)
Dept: PULMONOLOGY | Age: 85
End: 2020-03-19

## 2020-03-19 NOTE — TELEPHONE ENCOUNTER
Patient has cancelled appointment based on the CDC recommended COVID-19 pandemic precautions. Appt josé luis for 3/26/20 cancelled per Dr Chanda Koyanagi patient can f/u prn. Patient aware to call office if she needs appt.